# Patient Record
Sex: FEMALE | Race: BLACK OR AFRICAN AMERICAN | NOT HISPANIC OR LATINO | Employment: FULL TIME | URBAN - METROPOLITAN AREA
[De-identification: names, ages, dates, MRNs, and addresses within clinical notes are randomized per-mention and may not be internally consistent; named-entity substitution may affect disease eponyms.]

---

## 2023-02-02 PROBLEM — J03.90 TONSILLITIS WITH EXUDATE: Status: ACTIVE | Noted: 2023-02-02

## 2023-02-02 PROBLEM — R01.1 MURMUR, CARDIAC: Status: ACTIVE | Noted: 2023-02-02

## 2023-02-02 PROBLEM — E11.9 TYPE 2 DIABETES MELLITUS (MULTI): Status: ACTIVE | Noted: 2023-02-02

## 2023-02-02 PROBLEM — R03.0 BORDERLINE HYPERTENSION: Status: ACTIVE | Noted: 2023-02-02

## 2023-02-02 PROBLEM — H10.45 CHRONIC ALLERGIC CONJUNCTIVITIS: Status: ACTIVE | Noted: 2023-02-02

## 2023-02-02 PROBLEM — J02.9 SORE THROAT: Status: ACTIVE | Noted: 2023-02-02

## 2023-02-02 PROBLEM — M17.10 PRIMARY LOCALIZED OSTEOARTHRITIS OF KNEE: Status: ACTIVE | Noted: 2023-02-02

## 2023-02-02 PROBLEM — E66.9 OBESITY: Status: ACTIVE | Noted: 2023-02-02

## 2023-02-02 PROBLEM — D57.3 SICKLE CELL TRAIT (CMS-HCC): Status: ACTIVE | Noted: 2023-02-02

## 2023-02-02 PROBLEM — M23.90 INTERNAL DERANGEMENT OF KNEE: Status: ACTIVE | Noted: 2023-02-02

## 2023-02-02 PROBLEM — K64.9 HEMORRHOIDS: Status: ACTIVE | Noted: 2023-02-02

## 2023-02-02 PROBLEM — G43.909 HEADACHE, MIGRAINE: Status: ACTIVE | Noted: 2023-02-02

## 2023-02-02 PROBLEM — H53.8 BLURRY VISION: Status: ACTIVE | Noted: 2023-02-02

## 2023-02-02 PROBLEM — Z97.5 BREAKTHROUGH BLEEDING ON NEXPLANON: Status: ACTIVE | Noted: 2023-02-02

## 2023-02-02 PROBLEM — R73.9 ELEVATED BLOOD SUGAR: Status: ACTIVE | Noted: 2023-02-02

## 2023-02-02 PROBLEM — N92.1 BREAKTHROUGH BLEEDING ON NEXPLANON: Status: ACTIVE | Noted: 2023-02-02

## 2023-02-02 PROBLEM — E55.9 VITAMIN D DEFICIENCY: Status: ACTIVE | Noted: 2023-02-02

## 2023-02-02 RX ORDER — INSULIN GLARGINE 100 [IU]/ML
40 INJECTION, SOLUTION SUBCUTANEOUS NIGHTLY
COMMUNITY
Start: 2019-07-29 | End: 2023-03-16 | Stop reason: SDUPTHER

## 2023-02-02 RX ORDER — BLOOD SUGAR DIAGNOSTIC
STRIP MISCELLANEOUS 4 TIMES DAILY
COMMUNITY
Start: 2020-01-21 | End: 2023-06-21 | Stop reason: SDUPTHER

## 2023-02-02 RX ORDER — IBUPROFEN 800 MG/1
1 TABLET ORAL 3 TIMES DAILY PRN
COMMUNITY
Start: 2021-07-07 | End: 2023-04-27 | Stop reason: SDUPTHER

## 2023-02-02 RX ORDER — ETONOGESTREL 68 MG/1
IMPLANT SUBCUTANEOUS
COMMUNITY
Start: 2018-02-19 | End: 2024-05-21 | Stop reason: ALTCHOICE

## 2023-02-02 RX ORDER — METFORMIN HYDROCHLORIDE 500 MG/1
1 TABLET ORAL 2 TIMES DAILY
COMMUNITY
Start: 2022-04-05 | End: 2023-03-16 | Stop reason: SDUPTHER

## 2023-02-02 RX ORDER — INSULIN LISPRO 100 [IU]/ML
INJECTION, SUSPENSION SUBCUTANEOUS
COMMUNITY
End: 2023-03-16 | Stop reason: ALTCHOICE

## 2023-03-14 ASSESSMENT — ENCOUNTER SYMPTOMS
POLYDIPSIA: 0
BLACKOUTS: 0
CONFUSION: 0
SPEECH DIFFICULTY: 0
NERVOUS/ANXIOUS: 0
HUNGER: 0
HEADACHES: 1
SWEATS: 1
WEAKNESS: 0
VISUAL CHANGE: 0
FATIGUE: 0
DIZZINESS: 1
BLURRED VISION: 0
SEIZURES: 0
POLYPHAGIA: 0
TREMORS: 0
WEIGHT LOSS: 0

## 2023-03-16 ENCOUNTER — OFFICE VISIT (OUTPATIENT)
Dept: PRIMARY CARE | Facility: CLINIC | Age: 38
End: 2023-03-16
Payer: COMMERCIAL

## 2023-03-16 VITALS
RESPIRATION RATE: 16 BRPM | WEIGHT: 293 LBS | BODY MASS INDEX: 50.02 KG/M2 | OXYGEN SATURATION: 98 % | DIASTOLIC BLOOD PRESSURE: 96 MMHG | SYSTOLIC BLOOD PRESSURE: 152 MMHG | HEIGHT: 64 IN

## 2023-03-16 DIAGNOSIS — E11.9 TYPE 2 DIABETES MELLITUS WITHOUT COMPLICATION, WITHOUT LONG-TERM CURRENT USE OF INSULIN (MULTI): Primary | ICD-10-CM

## 2023-03-16 DIAGNOSIS — R45.86 MOOD SWINGS: ICD-10-CM

## 2023-03-16 DIAGNOSIS — F41.9 ANXIETY AND DEPRESSION: ICD-10-CM

## 2023-03-16 DIAGNOSIS — F32.A ANXIETY AND DEPRESSION: ICD-10-CM

## 2023-03-16 DIAGNOSIS — G43.109 MIGRAINE WITH AURA AND WITHOUT STATUS MIGRAINOSUS, NOT INTRACTABLE: ICD-10-CM

## 2023-03-16 PROBLEM — J03.90 TONSILLITIS WITH EXUDATE: Status: RESOLVED | Noted: 2023-02-02 | Resolved: 2023-03-16

## 2023-03-16 PROBLEM — K64.9 HEMORRHOIDS: Status: RESOLVED | Noted: 2023-02-02 | Resolved: 2023-03-16

## 2023-03-16 PROBLEM — J02.9 SORE THROAT: Status: RESOLVED | Noted: 2023-02-02 | Resolved: 2023-03-16

## 2023-03-16 LAB
ALBUMIN (MG/L) IN URINE: <7 MG/L
ALBUMIN/CREATININE (UG/MG) IN URINE: NORMAL UG/MG CRT (ref 0–30)
CALCIDIOL (25 OH VITAMIN D3) (NG/ML) IN SER/PLAS: 23 NG/ML
CREATININE (MG/DL) IN URINE: 169 MG/DL (ref 20–320)
POC HEMOGLOBIN A1C: 6.2 % (ref 4.2–6.5)
THYROTROPIN (MIU/L) IN SER/PLAS BY DETECTION LIMIT <= 0.05 MIU/L: 2.03 MIU/L (ref 0.44–3.98)

## 2023-03-16 PROCEDURE — 82306 VITAMIN D 25 HYDROXY: CPT

## 2023-03-16 PROCEDURE — 3080F DIAST BP >= 90 MM HG: CPT | Performed by: NURSE PRACTITIONER

## 2023-03-16 PROCEDURE — 83036 HEMOGLOBIN GLYCOSYLATED A1C: CPT | Performed by: NURSE PRACTITIONER

## 2023-03-16 PROCEDURE — 99214 OFFICE O/P EST MOD 30 MIN: CPT | Performed by: NURSE PRACTITIONER

## 2023-03-16 PROCEDURE — 36415 COLL VENOUS BLD VENIPUNCTURE: CPT | Performed by: NURSE PRACTITIONER

## 2023-03-16 PROCEDURE — 1036F TOBACCO NON-USER: CPT | Performed by: NURSE PRACTITIONER

## 2023-03-16 PROCEDURE — 84443 ASSAY THYROID STIM HORMONE: CPT

## 2023-03-16 PROCEDURE — 82043 UR ALBUMIN QUANTITATIVE: CPT

## 2023-03-16 PROCEDURE — 3008F BODY MASS INDEX DOCD: CPT | Performed by: NURSE PRACTITIONER

## 2023-03-16 PROCEDURE — 82570 ASSAY OF URINE CREATININE: CPT

## 2023-03-16 PROCEDURE — 3077F SYST BP >= 140 MM HG: CPT | Performed by: NURSE PRACTITIONER

## 2023-03-16 RX ORDER — INSULIN GLARGINE 100 [IU]/ML
35 INJECTION, SOLUTION SUBCUTANEOUS NIGHTLY
Qty: 4 EACH | Refills: 11 | Status: SHIPPED | OUTPATIENT
Start: 2023-03-16 | End: 2023-06-28 | Stop reason: SDUPTHER

## 2023-03-16 RX ORDER — ATORVASTATIN CALCIUM 20 MG/1
20 TABLET, FILM COATED ORAL DAILY
Qty: 90 TABLET | Refills: 1 | Status: SHIPPED | OUTPATIENT
Start: 2023-03-16 | End: 2023-06-21 | Stop reason: SDUPTHER

## 2023-03-16 RX ORDER — METFORMIN HYDROCHLORIDE 500 MG/1
500 TABLET ORAL 2 TIMES DAILY
Qty: 180 TABLET | Refills: 3 | Status: SHIPPED | OUTPATIENT
Start: 2023-03-16 | End: 2023-06-28 | Stop reason: SDUPTHER

## 2023-03-16 NOTE — PROGRESS NOTES
Subjective   Patient ID: Mike Chase is a 38 y.o. female who presents for follow up on diabetes. Has complaints of migraines and depression/anxiety.     HPI    Getting migraines at least twice per month. They typically last a couple days. She doesn't know what her trigger is, possibly stress. Typically, she has a sharp, stabbing pain on the right side of her head. Gets an aura, she sees floaters. Gets light sensitivity and occasionally nausea. She has taken sumatriptan without relief. Ibuprofen usually helps but did not seem to help the last couple times it did not have it. Has been on topiramate for migraines in the past but stopped taking because they ran out.     Thinks she's depressed. States she's overwhelmed with working full time, being a single parent. She worries a lot about every one else and has no time for herself. She has weeks where she feels good and happy, and weeks where she feels down/sad. States her coping mechanisms are to overindulge in food or overspend. She denies SI/HI. She would like to talk to a therapist.     T2DM  Last HgA1c: 6.4% on 9/15/22  Current meds: metformin 500 mg BID, basaglar 40 units QHS  Home glucose monitoring: daily  - High: 160s  - Low: 58  - Average: 100s  Diet: pretty good  Exercise: none  Statin: none  ACEI: none  ASA: none  Last urine albumin: 3/29/22  Diabetic foot exam: 6/15/22  Vision exam: within the last year  Dental cleaning: years  Pneumovax: 2013    Denies polyuria, polydipsia, vision changes, yeast infections, neuropathy, or hypoglycemic episodes.        ROS  ROS negative except as noted above in HPI.       Objective   There were no vitals taken for this visit.    Physical Exam  General: Alert and oriented, in no acute distress. Appears stated age, well-nourished, and well hydrated  HEENT:  - Head: Normocephalic and atraumatic   - Eyes: EOMI, PERRLA  - ENT: Hearing grossly intact  Heart: RRR. No murmurs, clicks, or rubs  Lungs: Unlabored breathing. CTAB with  no crackles, wheezes, or rhonchi  Abdomen: Normal BS in all 4 quadrants. Soft, non-tender, non-distended, with no masses  Extremities: Warm and well perfused. No edema. Normal peripheral pulses  Musculoskeletal: Normal gait and station  Neurological: Alert and oriented. No gross neurological deficits  Psychological: Appropriate mood and affect. Tearful at times  Skin: No rash, abnormal lesions, cyanosis, or erythema      Assessment/Plan     Migraines  - Sumatriptan not working, elevated BP in office so rizatriptan contraindicated  - Start Ubrelvy 100 mg Q2H PRN (2 tabs/24 hours) - samples also provided  - Keep headache diary  - Avoid known triggers if possible    Anxiety/depression/mood swings  - Referral placed for access clinic  - Check TSH, vit D    T2DM  - IO HgA1c: 6.2%  - Continue metformin 500 mg BID, basaglar 40 units at bedtime  - Continue to monitor glucose at home  - Urine albumin collected today  - Monofilament up to date  - Encouraged dental cleanings twice per year and retinal eye exams once per year  - Lifestyle management    Obesity  - Referral placed for bariatric surgery per patient request    RTC in 1 month for BP check    Reviewed and approved by MAISHA HARKINS on 3/16/23 at 4:12 PM.

## 2023-03-17 ENCOUNTER — TELEPHONE (OUTPATIENT)
Dept: PRIMARY CARE | Facility: CLINIC | Age: 38
End: 2023-03-17
Payer: COMMERCIAL

## 2023-03-17 DIAGNOSIS — E55.9 VITAMIN D DEFICIENCY: Primary | ICD-10-CM

## 2023-03-17 RX ORDER — ACETAMINOPHEN 500 MG
5000 TABLET ORAL DAILY
Qty: 90 TABLET | Refills: 0 | Status: SHIPPED | OUTPATIENT
Start: 2023-03-17 | End: 2023-06-21 | Stop reason: ALTCHOICE

## 2023-03-17 NOTE — TELEPHONE ENCOUNTER
Notified patient of results      ----- Message from RADHA Banks sent at 3/17/2023 11:54 AM EDT -----  Please let patient know her vitamin D was low at 23 (normal ). I sent a prescription for vitamin D 5000 units to take daily for 3 months. After that she can take 2000 units daily.

## 2023-04-13 ENCOUNTER — OFFICE VISIT (OUTPATIENT)
Dept: PRIMARY CARE | Facility: CLINIC | Age: 38
End: 2023-04-13
Payer: COMMERCIAL

## 2023-04-13 VITALS
BODY MASS INDEX: 65.02 KG/M2 | SYSTOLIC BLOOD PRESSURE: 142 MMHG | TEMPERATURE: 97.6 F | HEART RATE: 77 BPM | DIASTOLIC BLOOD PRESSURE: 72 MMHG | OXYGEN SATURATION: 100 % | WEIGHT: 293 LBS

## 2023-04-13 DIAGNOSIS — G43.109 MIGRAINE WITH AURA AND WITHOUT STATUS MIGRAINOSUS, NOT INTRACTABLE: ICD-10-CM

## 2023-04-13 DIAGNOSIS — I10 BENIGN ESSENTIAL HYPERTENSION: Primary | ICD-10-CM

## 2023-04-13 DIAGNOSIS — Z79.4 TYPE 2 DIABETES MELLITUS WITHOUT COMPLICATION, WITH LONG-TERM CURRENT USE OF INSULIN (MULTI): ICD-10-CM

## 2023-04-13 DIAGNOSIS — E11.9 TYPE 2 DIABETES MELLITUS WITHOUT COMPLICATION, WITH LONG-TERM CURRENT USE OF INSULIN (MULTI): ICD-10-CM

## 2023-04-13 PROCEDURE — 4010F ACE/ARB THERAPY RXD/TAKEN: CPT | Performed by: NURSE PRACTITIONER

## 2023-04-13 PROCEDURE — 3077F SYST BP >= 140 MM HG: CPT | Performed by: NURSE PRACTITIONER

## 2023-04-13 PROCEDURE — 3078F DIAST BP <80 MM HG: CPT | Performed by: NURSE PRACTITIONER

## 2023-04-13 PROCEDURE — 1036F TOBACCO NON-USER: CPT | Performed by: NURSE PRACTITIONER

## 2023-04-13 PROCEDURE — 3008F BODY MASS INDEX DOCD: CPT | Performed by: NURSE PRACTITIONER

## 2023-04-13 PROCEDURE — 99214 OFFICE O/P EST MOD 30 MIN: CPT | Performed by: NURSE PRACTITIONER

## 2023-04-13 RX ORDER — LISINOPRIL 10 MG/1
10 TABLET ORAL DAILY
Qty: 30 TABLET | Refills: 0 | Status: SHIPPED | OUTPATIENT
Start: 2023-04-13 | End: 2023-04-27 | Stop reason: SDUPTHER

## 2023-04-13 NOTE — PROGRESS NOTES
Subjective   Patient ID: Mike Chase is a 38 y.o. female who presents for Migraine (FUV/Pt stated the medication helps; she still gets them.) and Hypertension (FUV).    HPI     She has had a couple migraines in the last month, lasting a couple days at a time. She did take Nurtec with each migraine but not until the second day. States the migraines went away within an hour or so and she did not have any side effects. Believes her migraine trigger is looking at her computer screens/switching between screens.     Of note, blood pressure still elevated in the office. She does not check her blood pressure at home. States her diet is pretty good. Does not exercise. Limits her sodium and caffeine intake.     Review of Systems    ROS negative except as noted above in HPI.     Objective   /72   Pulse 77   Temp 36.4 °C (97.6 °F)   Wt (!) 172 kg (378 lb 12.8 oz)   SpO2 100%   BMI 65.02 kg/m²     Physical Exam    General: Alert and oriented, in no acute distress. Appears stated age, well-nourished, and well hydrated  HEENT:  - Head: Normocephalic and atraumatic   - Eyes: EOMI, PERRLA  - ENT: Hearing grossly intact  Heart: RRR. No murmurs, clicks, or rubs  Lungs: Unlabored breathing. CTAB with no crackles, wheezes, or rhonchi  Abdomen: Normal BS in all 4 quadrants. Soft, non-tender, non-distended, with no masses  Extremities: Warm and well perfused. No edema. Normal peripheral pulses  Musculoskeletal: Normal gait and station  Neurological: Alert and oriented. No gross neurological deficits  Psychological: Appropriate mood and affect  Skin: No rash, abnormal lesions, cyanosis, or erythema      Assessment/Plan     Migraines  - Has had 2 migraines in the last month, lasting 2 days - resolved when she took Nurtec  -- Advised to take Nurtec at first sign of migraine  - Believes trigger is computer screen/switching between screens  - Keep headache diary  - Avoid known triggers when possible    Hypertension, goal <130/80  -  Uncontrolled  - Start lisinopril 10 mg every day (also provides renoprotection with T2DM)  - Check BP at home   - Lifestyle management    RTC in 1 month for hypertension or sooner prn    Reviewed and approved by MAISHA HARKINS on 4/13/23 at 10:16 AM.

## 2023-04-27 DIAGNOSIS — Z79.4 TYPE 2 DIABETES MELLITUS WITHOUT COMPLICATION, WITH LONG-TERM CURRENT USE OF INSULIN (MULTI): ICD-10-CM

## 2023-04-27 DIAGNOSIS — I10 BENIGN ESSENTIAL HYPERTENSION: ICD-10-CM

## 2023-04-27 DIAGNOSIS — E11.9 TYPE 2 DIABETES MELLITUS WITHOUT COMPLICATION, WITH LONG-TERM CURRENT USE OF INSULIN (MULTI): ICD-10-CM

## 2023-04-27 DIAGNOSIS — M23.90 INTERNAL DERANGEMENT OF KNEE, UNSPECIFIED LATERALITY: Primary | ICD-10-CM

## 2023-04-27 RX ORDER — IBUPROFEN 800 MG/1
800 TABLET ORAL 3 TIMES DAILY PRN
Qty: 90 TABLET | Refills: 0 | Status: SHIPPED | OUTPATIENT
Start: 2023-04-27 | End: 2023-05-27

## 2023-04-27 RX ORDER — LISINOPRIL 10 MG/1
10 TABLET ORAL DAILY
Qty: 30 TABLET | Refills: 0 | Status: SHIPPED | OUTPATIENT
Start: 2023-04-27 | End: 2023-06-15 | Stop reason: SDUPTHER

## 2023-05-30 ENCOUNTER — APPOINTMENT (OUTPATIENT)
Dept: PRIMARY CARE | Facility: CLINIC | Age: 38
End: 2023-05-30
Payer: COMMERCIAL

## 2023-06-15 DIAGNOSIS — I10 BENIGN ESSENTIAL HYPERTENSION: ICD-10-CM

## 2023-06-15 DIAGNOSIS — Z79.4 TYPE 2 DIABETES MELLITUS WITHOUT COMPLICATION, WITH LONG-TERM CURRENT USE OF INSULIN (MULTI): ICD-10-CM

## 2023-06-15 DIAGNOSIS — E11.9 TYPE 2 DIABETES MELLITUS WITHOUT COMPLICATION, WITH LONG-TERM CURRENT USE OF INSULIN (MULTI): ICD-10-CM

## 2023-06-15 RX ORDER — LISINOPRIL 10 MG/1
10 TABLET ORAL DAILY
Qty: 30 TABLET | Refills: 0 | Status: SHIPPED | OUTPATIENT
Start: 2023-06-15 | End: 2023-06-28 | Stop reason: SDUPTHER

## 2023-06-21 ENCOUNTER — OFFICE VISIT (OUTPATIENT)
Dept: PRIMARY CARE | Facility: CLINIC | Age: 38
End: 2023-06-21
Payer: COMMERCIAL

## 2023-06-21 VITALS
SYSTOLIC BLOOD PRESSURE: 116 MMHG | WEIGHT: 293 LBS | HEIGHT: 64 IN | BODY MASS INDEX: 50.02 KG/M2 | HEART RATE: 75 BPM | DIASTOLIC BLOOD PRESSURE: 87 MMHG

## 2023-06-21 DIAGNOSIS — E11.9 TYPE 2 DIABETES MELLITUS WITHOUT COMPLICATION, WITHOUT LONG-TERM CURRENT USE OF INSULIN (MULTI): ICD-10-CM

## 2023-06-21 DIAGNOSIS — I10 PRIMARY HYPERTENSION: Primary | ICD-10-CM

## 2023-06-21 PROBLEM — M54.2 CERVICALGIA: Status: ACTIVE | Noted: 2023-06-21

## 2023-06-21 PROBLEM — M54.50 LUMBAGO WITHOUT SCIATICA: Status: ACTIVE | Noted: 2023-06-21

## 2023-06-21 PROCEDURE — 3074F SYST BP LT 130 MM HG: CPT | Performed by: NURSE PRACTITIONER

## 2023-06-21 PROCEDURE — 3008F BODY MASS INDEX DOCD: CPT | Performed by: NURSE PRACTITIONER

## 2023-06-21 PROCEDURE — 99213 OFFICE O/P EST LOW 20 MIN: CPT | Performed by: NURSE PRACTITIONER

## 2023-06-21 PROCEDURE — 1036F TOBACCO NON-USER: CPT | Performed by: NURSE PRACTITIONER

## 2023-06-21 PROCEDURE — 3079F DIAST BP 80-89 MM HG: CPT | Performed by: NURSE PRACTITIONER

## 2023-06-21 PROCEDURE — 4010F ACE/ARB THERAPY RXD/TAKEN: CPT | Performed by: NURSE PRACTITIONER

## 2023-06-21 RX ORDER — ATORVASTATIN CALCIUM 20 MG/1
20 TABLET, FILM COATED ORAL DAILY
Qty: 90 TABLET | Refills: 1 | Status: SHIPPED | OUTPATIENT
Start: 2023-06-21 | End: 2023-06-28 | Stop reason: SDUPTHER

## 2023-06-21 RX ORDER — BLOOD SUGAR DIAGNOSTIC
STRIP MISCELLANEOUS
Qty: 200 STRIP | Refills: 3 | Status: SHIPPED | OUTPATIENT
Start: 2023-06-21

## 2023-06-21 RX ORDER — IBUPROFEN 800 MG/1
800 TABLET ORAL EVERY 8 HOURS PRN
COMMUNITY
Start: 2015-07-13 | End: 2023-06-28 | Stop reason: SDUPTHER

## 2023-06-21 NOTE — PROGRESS NOTES
"Subjective   Patient ID: Mike Chase is a 38 y.o. female who presents for Hypertension.    HPI   Current meds: lisinopril 10 mg every day (did not take this morning)  Home blood pressure monitoring: none, does not have a monitor  Salt intake: limits   Caffeine intake: occasional tea  Diet: fine   Exercise: active   Alcohol use: occasional  Tobacco use: none  Illicit drug use: none    Denies vision changes, headaches, dizziness, chest pain, palpitations, or edema.      Review of Systems  ROS negative except as noted above in HPI.     Objective   /87   Pulse 75   Ht 1.626 m (5' 4\")   Wt (!) 168 kg (371 lb 3.2 oz)   BMI 63.72 kg/m²     Physical Exam  General: Alert and oriented, in no acute distress. Appears stated age, well-nourished, and well hydrated  HEENT:  - Head: Normocephalic and atraumatic   - Eyes: EOMI, PERRLA  - ENT: Hearing grossly intact  Heart: RRR. No murmurs, clicks, or rubs  Lungs: Unlabored breathing. CTAB with no crackles, wheezes, or rhonchi  Abdomen: Normal BS in all 4 quadrants. Soft, non-tender, non-distended, with no masses  Extremities: Warm and well perfused. No edema. Normal peripheral pulses  Musculoskeletal: Normal gait and station  Neurological: Alert and oriented. No gross neurological deficits  Psychological: Appropriate mood and affect  Skin: No rash, abnormal lesions, cyanosis, or erythema      Assessment/Plan   Hypertension, goal <130/80  - Diastolic above goal, but did not take lisinopril this morning  - Continue lisinopril 10 mg every day  - Obtain BP monitor to check at home  - Lifestyle management    RTC in 1 month for chronic care or sooner prn; advised to take lisinopril at least an hour prior    THEODORE Kinney-CNP  [facility]          "

## 2023-06-28 DIAGNOSIS — G43.109 MIGRAINE WITH AURA AND WITHOUT STATUS MIGRAINOSUS, NOT INTRACTABLE: Primary | ICD-10-CM

## 2023-06-28 DIAGNOSIS — I10 BENIGN ESSENTIAL HYPERTENSION: ICD-10-CM

## 2023-06-28 DIAGNOSIS — E11.9 TYPE 2 DIABETES MELLITUS WITHOUT COMPLICATION, WITHOUT LONG-TERM CURRENT USE OF INSULIN (MULTI): ICD-10-CM

## 2023-06-28 DIAGNOSIS — Z79.4 TYPE 2 DIABETES MELLITUS WITHOUT COMPLICATION, WITH LONG-TERM CURRENT USE OF INSULIN (MULTI): ICD-10-CM

## 2023-06-28 DIAGNOSIS — E11.9 TYPE 2 DIABETES MELLITUS WITHOUT COMPLICATION, WITH LONG-TERM CURRENT USE OF INSULIN (MULTI): ICD-10-CM

## 2023-06-28 RX ORDER — IBUPROFEN 800 MG/1
800 TABLET ORAL EVERY 8 HOURS PRN
Qty: 90 TABLET | Refills: 0 | Status: SHIPPED | OUTPATIENT
Start: 2023-06-28 | End: 2023-08-09 | Stop reason: SDUPTHER

## 2023-06-28 RX ORDER — METFORMIN HYDROCHLORIDE 500 MG/1
500 TABLET ORAL 2 TIMES DAILY
Qty: 180 TABLET | Refills: 3 | Status: SHIPPED | OUTPATIENT
Start: 2023-06-28 | End: 2023-10-24 | Stop reason: SDUPTHER

## 2023-06-28 RX ORDER — LISINOPRIL 10 MG/1
10 TABLET ORAL DAILY
Qty: 90 TABLET | Refills: 1 | Status: SHIPPED | OUTPATIENT
Start: 2023-06-28 | End: 2023-07-19 | Stop reason: SDUPTHER

## 2023-06-28 RX ORDER — INSULIN GLARGINE 100 [IU]/ML
35 INJECTION, SOLUTION SUBCUTANEOUS NIGHTLY
Qty: 33 ML | Refills: 1 | Status: SHIPPED | OUTPATIENT
Start: 2023-06-28 | End: 2023-11-22 | Stop reason: SDUPTHER

## 2023-06-28 RX ORDER — ATORVASTATIN CALCIUM 20 MG/1
20 TABLET, FILM COATED ORAL DAILY
Qty: 90 TABLET | Refills: 1 | Status: SHIPPED | OUTPATIENT
Start: 2023-06-28 | End: 2023-12-15 | Stop reason: SDUPTHER

## 2023-07-19 ENCOUNTER — OFFICE VISIT (OUTPATIENT)
Dept: PRIMARY CARE | Facility: CLINIC | Age: 38
End: 2023-07-19
Payer: COMMERCIAL

## 2023-07-19 VITALS
OXYGEN SATURATION: 99 % | DIASTOLIC BLOOD PRESSURE: 96 MMHG | BODY MASS INDEX: 63.72 KG/M2 | HEART RATE: 77 BPM | SYSTOLIC BLOOD PRESSURE: 144 MMHG | HEIGHT: 64 IN

## 2023-07-19 DIAGNOSIS — R29.818 SUSPECTED SLEEP APNEA: ICD-10-CM

## 2023-07-19 DIAGNOSIS — I10 BENIGN ESSENTIAL HYPERTENSION: ICD-10-CM

## 2023-07-19 DIAGNOSIS — F41.9 ANXIETY AND DEPRESSION: ICD-10-CM

## 2023-07-19 DIAGNOSIS — R51.9 CHRONIC DAILY HEADACHE: ICD-10-CM

## 2023-07-19 DIAGNOSIS — G43.109 MIGRAINE WITH AURA AND WITHOUT STATUS MIGRAINOSUS, NOT INTRACTABLE: ICD-10-CM

## 2023-07-19 DIAGNOSIS — Z79.4 TYPE 2 DIABETES MELLITUS WITHOUT COMPLICATION, WITH LONG-TERM CURRENT USE OF INSULIN (MULTI): Primary | ICD-10-CM

## 2023-07-19 DIAGNOSIS — F32.A ANXIETY AND DEPRESSION: ICD-10-CM

## 2023-07-19 DIAGNOSIS — E11.9 TYPE 2 DIABETES MELLITUS WITHOUT COMPLICATION, WITH LONG-TERM CURRENT USE OF INSULIN (MULTI): Primary | ICD-10-CM

## 2023-07-19 PROBLEM — R73.9 ELEVATED BLOOD SUGAR: Status: RESOLVED | Noted: 2023-02-02 | Resolved: 2023-07-19

## 2023-07-19 PROBLEM — R03.0 BORDERLINE HYPERTENSION: Status: RESOLVED | Noted: 2023-02-02 | Resolved: 2023-07-19

## 2023-07-19 LAB — POC HEMOGLOBIN A1C: 6.5 % (ref 4.2–6.5)

## 2023-07-19 PROCEDURE — 1036F TOBACCO NON-USER: CPT | Performed by: NURSE PRACTITIONER

## 2023-07-19 PROCEDURE — 83036 HEMOGLOBIN GLYCOSYLATED A1C: CPT | Performed by: NURSE PRACTITIONER

## 2023-07-19 PROCEDURE — 3080F DIAST BP >= 90 MM HG: CPT | Performed by: NURSE PRACTITIONER

## 2023-07-19 PROCEDURE — 4010F ACE/ARB THERAPY RXD/TAKEN: CPT | Performed by: NURSE PRACTITIONER

## 2023-07-19 PROCEDURE — 3008F BODY MASS INDEX DOCD: CPT | Performed by: NURSE PRACTITIONER

## 2023-07-19 PROCEDURE — 99214 OFFICE O/P EST MOD 30 MIN: CPT | Performed by: NURSE PRACTITIONER

## 2023-07-19 PROCEDURE — 3077F SYST BP >= 140 MM HG: CPT | Performed by: NURSE PRACTITIONER

## 2023-07-19 RX ORDER — LISINOPRIL 20 MG/1
20 TABLET ORAL DAILY
Qty: 90 TABLET | Refills: 0 | Status: SHIPPED | OUTPATIENT
Start: 2023-07-19 | End: 2023-08-09 | Stop reason: SDUPTHER

## 2023-07-19 RX ORDER — TOPIRAMATE 25 MG/1
TABLET ORAL
Qty: 70 TABLET | Refills: 0 | Status: SHIPPED | OUTPATIENT
Start: 2023-07-19 | End: 2023-08-09 | Stop reason: SDUPTHER

## 2023-07-19 ASSESSMENT — ENCOUNTER SYMPTOMS
HEADACHES: 0
VISUAL CHANGE: 0
BLACKOUTS: 0
SPEECH DIFFICULTY: 0
CONFUSION: 0
BLURRED VISION: 0
TREMORS: 0
WEAKNESS: 0
POLYPHAGIA: 0
FATIGUE: 0
WEIGHT LOSS: 0
SWEATS: 0
HUNGER: 0
POLYDIPSIA: 0
NERVOUS/ANXIOUS: 0
DIZZINESS: 0
SEIZURES: 0

## 2023-07-19 NOTE — PROGRESS NOTES
Subjective   Patient ID: Mike Chase is a 38 y.o. female who presents for Follow-up.    HPI     Hypertension  Current meds: lisinopril 10 mg every day   Home blood pressure monitoring: once daily in the morning, running 110s/80  Salt intake: limits   Caffeine intake: occasional tea  Diet: fine   Exercise: active   Alcohol use: occasional  Tobacco use: none  Illicit drug use: none     Denies vision changes, headaches, dizziness, chest pain, palpitations, or edema.    T2DM  Last HgA1c: 6.2% on 3/16/23  Current meds: metformin 500 mg BID, basaglar 35 units QHS  Home glucose monitoring: daily  - High: 110  - Low: 67  - Average: 85-90  Diet: as above  Exercise: as above  Statin: none  ACEI: none  ASA: none  Last urine albumin: 3/16/23  Diabetic foot exam: 6/15/22, declined   Vision exam: 2022  Dental cleaning: years  Pneumovax: 2013     Had one episode of hypoglycemia this past week (67). Ate some food and it came back up. Denies polyuria, polydipsia, vision changes, yeast infections, or neuropathy.     Migraines  Getting migraines at least twice per month. They typically last a couple days. She doesn't know what her trigger is, possibly stress. Typically, she has a sharp, stabbing pain on the right side of her head. Gets an aura, she sees floaters. Gets light sensitivity and occasionally nausea. Has tried Ubrelvy and Nurtec samples and they helps sometimes. Has been getting frontal headaches daily when working in front of the computer screen. Wears blue light glasses. Has been on topiramate for migraines in the past but stopped taking because they ran out. Would like to restart.     Anxiety/depression  Manageable at this time. She is seeing a counselor once per week, eventually will be seeing her every other week. Does have trouble falling asleep because she's restless and her brain doesn't shut off. She does wake up frequently at night but denies cough/choking or witness apneic episodes. States she snores at times.  "Denies SI/HI.     Review of Systems  ROS negative except as noted above in HPI.       Objective   BP (!) 144/96   Pulse 77   Ht 1.626 m (5' 4\")   SpO2 99%   BMI 63.72 kg/m²     Physical Exam  General: Alert and oriented, in no acute distress. Appears stated age, well-nourished, and well hydrated  HEENT:  - Head: Normocephalic and atraumatic   - Eyes: EOMI, PERRLA  - ENT: Hearing grossly intact  Heart: RRR. No murmurs, clicks, or rubs  Lungs: Unlabored breathing. CTAB with no crackles, wheezes, or rhonchi  Abdomen: Normal BS in all 4 quadrants. Soft, non-tender, non-distended, with no masses  Extremities: Warm and well perfused. No edema. Normal peripheral pulses  Musculoskeletal: Normal gait and station  Neurological: Alert and oriented. No gross neurological deficits  Psychological: Appropriate mood and affect  Skin: No rash, abnormal lesions, cyanosis, or erythema      Assessment/Plan   T2DM  - IO HgA1c: 6.5%  - Continue metformin 500 mg BID, basaglar 35 units at bedtime  - Continue to monitor glucose at home  - Urine albumin up to date  - Monofilament due, declined  - Encouraged dental cleanings twice per year and retinal eye exams once per year  - Lifestyle management     Hypertension, goal <130/80  - Uncontrolled  - Increase lisinopril to 20 mg every day  - Lifestyle management    Migraines  - Triptans contraindicated with elevated BP  - RX sent for Ubrelvy 100 mg Q2H PRN (2 tabs/24H)  - Keep headache diary  - Avoid known triggers if possible    Anxiety/depression  - Manageable at this time  - Continue counseling  - Declined medication    Suspected sleep apnea  - Obtain HSAT    RTC in 1 month for hypertension and headaches or sooner prn    THEODORE Kinney-CNP  [facility]          "

## 2023-08-07 ASSESSMENT — ENCOUNTER SYMPTOMS
PALPITATIONS: 0
NECK PAIN: 0
HEADACHES: 0
BLURRED VISION: 0
SHORTNESS OF BREATH: 0
SWEATS: 0
PND: 0
HYPERTENSION: 1
ORTHOPNEA: 0

## 2023-08-09 ENCOUNTER — OFFICE VISIT (OUTPATIENT)
Dept: PRIMARY CARE | Facility: CLINIC | Age: 38
End: 2023-08-09
Payer: COMMERCIAL

## 2023-08-09 VITALS
HEART RATE: 71 BPM | DIASTOLIC BLOOD PRESSURE: 87 MMHG | SYSTOLIC BLOOD PRESSURE: 132 MMHG | RESPIRATION RATE: 16 BRPM | WEIGHT: 293 LBS | HEIGHT: 64 IN | BODY MASS INDEX: 50.02 KG/M2

## 2023-08-09 DIAGNOSIS — I10 BENIGN ESSENTIAL HYPERTENSION: Primary | ICD-10-CM

## 2023-08-09 DIAGNOSIS — E11.9 TYPE 2 DIABETES MELLITUS WITHOUT COMPLICATION, WITH LONG-TERM CURRENT USE OF INSULIN (MULTI): ICD-10-CM

## 2023-08-09 DIAGNOSIS — R51.9 CHRONIC DAILY HEADACHE: ICD-10-CM

## 2023-08-09 DIAGNOSIS — Z79.4 TYPE 2 DIABETES MELLITUS WITHOUT COMPLICATION, WITH LONG-TERM CURRENT USE OF INSULIN (MULTI): ICD-10-CM

## 2023-08-09 DIAGNOSIS — G43.109 MIGRAINE WITH AURA AND WITHOUT STATUS MIGRAINOSUS, NOT INTRACTABLE: ICD-10-CM

## 2023-08-09 PROCEDURE — 1036F TOBACCO NON-USER: CPT | Performed by: NURSE PRACTITIONER

## 2023-08-09 PROCEDURE — 3075F SYST BP GE 130 - 139MM HG: CPT | Performed by: NURSE PRACTITIONER

## 2023-08-09 PROCEDURE — 99213 OFFICE O/P EST LOW 20 MIN: CPT | Performed by: NURSE PRACTITIONER

## 2023-08-09 PROCEDURE — 3008F BODY MASS INDEX DOCD: CPT | Performed by: NURSE PRACTITIONER

## 2023-08-09 PROCEDURE — 4010F ACE/ARB THERAPY RXD/TAKEN: CPT | Performed by: NURSE PRACTITIONER

## 2023-08-09 PROCEDURE — 3079F DIAST BP 80-89 MM HG: CPT | Performed by: NURSE PRACTITIONER

## 2023-08-09 RX ORDER — LISINOPRIL 20 MG/1
20 TABLET ORAL DAILY
Qty: 90 TABLET | Refills: 0 | Status: SHIPPED | OUTPATIENT
Start: 2023-08-09 | End: 2023-08-24

## 2023-08-09 RX ORDER — TOPIRAMATE 25 MG/1
TABLET ORAL
Qty: 70 TABLET | Refills: 0 | Status: SHIPPED | OUTPATIENT
Start: 2023-08-09 | End: 2023-09-08 | Stop reason: SDUPTHER

## 2023-08-09 RX ORDER — IBUPROFEN 800 MG/1
800 TABLET ORAL EVERY 8 HOURS PRN
Qty: 90 TABLET | Refills: 0 | Status: SHIPPED | OUTPATIENT
Start: 2023-08-09 | End: 2024-02-29 | Stop reason: SDUPTHER

## 2023-08-09 NOTE — PROGRESS NOTES
"Subjective   Patient ID: Mike Chase is a 38 y.o. female who presents for Hypertension (fuv) and Headache (fuv).    HPI     Hypertension  Current meds: lisinopril 20 mg every day   Home blood pressure monitoring: daily   High: 130/90  Low: 120/75  Average: 130/80  Salt intake: limits   Caffeine intake: none  Diet: getting better   Exercise: active  Alcohol use: occasional  Tobacco use: none  Illicit drug use: none    Denies vision changes, headaches, dizziness, chest pain, palpitations, or edema.      Answers submitted by the patient for this visit:  High Blood Pressure Questionnaire (Submitted on 8/7/2023)  Chief Complaint: Hypertension  Chronicity: new  Onset: 1 to 4 weeks ago  Progression since onset: resolved  anxiety: No  blurred vision: No  chest pain: No  headaches: No  malaise/fatigue: No  neck pain: No  orthopnea: No  palpitations: No  peripheral edema: No  PND: No  shortness of breath: No  sweats: No  Agents associated with hypertension: no associated agents  CAD risks: diabetes mellitus, obesity  Compliance problems: no compliance problems    Migraines  Admits that she did not  topiramate because she has not had a chance to go to the pharmacy. She has been taking ibuprofen twice daily for her chronic knee pain and headaches when she gets them. She took Ubrelvy once and it worked well to get rid of her migraine.     Review of Systems  ROS negative except as noted above in HPI.       Objective   /87   Pulse 71   Ht 1.626 m (5' 4\")   Wt (!) 169 kg (371 lb 12.8 oz)   BMI 63.82 kg/m²     Physical Exam  General: Alert and oriented, in no acute distress. Appears stated age, well-nourished, and well hydrated  HEENT:  - Head: Normocephalic and atraumatic   - Eyes: EOMI, PERRLA  - ENT: Hearing grossly intact  Heart: RRR. No murmurs, clicks, or rubs  Lungs: Unlabored breathing. CTAB with no crackles, wheezes, or rhonchi  Abdomen: Normal BS in all 4 quadrants. Soft, non-tender, non-distended, with " no masses  Extremities: Warm and well perfused. No edema. Normal peripheral pulses  Musculoskeletal: Normal gait and station  Neurological: Alert and oriented. No gross neurological deficits  Psychological: Appropriate mood and affect  Skin: No rash, abnormal lesions, cyanosis, or erythema    Assessment/Plan   Hypertension, goal <130/80  - Patient has been taking ibuprofen 800 mg BID, which is likely causing her BP to be elevated  - Encouraged to cut back on ibuprofen  - Continue lisinopril 20 mg every day  - Continue to monitor BP at home  - Lifestyle management    Migraines/headaches  - Limit ibuprofen use as above  - Sent topiramate to [facility]  - Take Ubrelvy prn    RTC in 1 month for physical, htn, and headaches or sooner prn; will complete labs at that appt    THEODORE Kinney-CNP  [facility]

## 2023-08-21 ENCOUNTER — TELEPHONE (OUTPATIENT)
Dept: PRIMARY CARE | Facility: CLINIC | Age: 38
End: 2023-08-21
Payer: COMMERCIAL

## 2023-08-21 NOTE — TELEPHONE ENCOUNTER
Patient seen in ER for pelvic abcess prescribed 2 antibiotics, the bactrim is making her sick. Patient requesting you reach out to her on my chart.

## 2023-08-24 DIAGNOSIS — I10 BENIGN ESSENTIAL HYPERTENSION: ICD-10-CM

## 2023-08-24 DIAGNOSIS — T78.3XXA ANGIOEDEMA, INITIAL ENCOUNTER: Primary | ICD-10-CM

## 2023-08-24 RX ORDER — PREDNISONE 10 MG/1
TABLET ORAL
Qty: 15 TABLET | Refills: 0 | Status: SHIPPED | OUTPATIENT
Start: 2023-08-24 | End: 2023-08-29

## 2023-08-24 RX ORDER — LOSARTAN POTASSIUM 25 MG/1
25 TABLET ORAL DAILY
Qty: 30 TABLET | Refills: 0 | Status: SHIPPED | OUTPATIENT
Start: 2023-08-24 | End: 2023-09-08 | Stop reason: SDUPTHER

## 2023-08-24 NOTE — PROGRESS NOTES
Patient sent [email or web address] message stating she developed lip swelling since increasing her dose of lisinopril. She denies tongue swelling, throat swelling, difficulty breathing, or wheezing. Advised to stop lisinopril (did not take this morning). Rx sent for prednisone 60891 taper and advised to take zyrtec 10 mg BID for 5 days (can go up to 20 mg BID if needed). Counseled on strict 911/ED precautions.     Rx sent for losartan 25 mg every day in place of lisinopril.    Patient verbalized understanding.     Kiara Trent, THEODORE-CNP  [facility]

## 2023-09-08 ENCOUNTER — TELEPHONE (OUTPATIENT)
Dept: PRIMARY CARE | Facility: CLINIC | Age: 38
End: 2023-09-08

## 2023-09-08 ENCOUNTER — OFFICE VISIT (OUTPATIENT)
Dept: PRIMARY CARE | Facility: CLINIC | Age: 38
End: 2023-09-08
Payer: COMMERCIAL

## 2023-09-08 VITALS
DIASTOLIC BLOOD PRESSURE: 76 MMHG | WEIGHT: 293 LBS | BODY MASS INDEX: 50.02 KG/M2 | HEART RATE: 75 BPM | SYSTOLIC BLOOD PRESSURE: 118 MMHG | HEIGHT: 64 IN | OXYGEN SATURATION: 100 %

## 2023-09-08 DIAGNOSIS — Z00.00 ANNUAL PHYSICAL EXAM: Primary | ICD-10-CM

## 2023-09-08 DIAGNOSIS — Z79.4 TYPE 2 DIABETES MELLITUS WITHOUT COMPLICATION, WITH LONG-TERM CURRENT USE OF INSULIN (MULTI): ICD-10-CM

## 2023-09-08 DIAGNOSIS — Z23 NEED FOR INFLUENZA VACCINATION: ICD-10-CM

## 2023-09-08 DIAGNOSIS — R51.9 CHRONIC DAILY HEADACHE: ICD-10-CM

## 2023-09-08 DIAGNOSIS — G43.109 MIGRAINE WITH AURA AND WITHOUT STATUS MIGRAINOSUS, NOT INTRACTABLE: ICD-10-CM

## 2023-09-08 DIAGNOSIS — I10 BENIGN ESSENTIAL HYPERTENSION: ICD-10-CM

## 2023-09-08 DIAGNOSIS — E11.9 TYPE 2 DIABETES MELLITUS WITHOUT COMPLICATION, WITH LONG-TERM CURRENT USE OF INSULIN (MULTI): ICD-10-CM

## 2023-09-08 DIAGNOSIS — M17.10 PRIMARY LOCALIZED OSTEOARTHRITIS OF KNEE: ICD-10-CM

## 2023-09-08 PROBLEM — L02.234 CARBUNCLE OF GROIN: Status: ACTIVE | Noted: 2023-09-08

## 2023-09-08 LAB
CHOLESTEROL (MG/DL) IN SER/PLAS: 135 MG/DL (ref 0–199)
CHOLESTEROL IN HDL (MG/DL) IN SER/PLAS: 50.7 MG/DL
CHOLESTEROL/HDL RATIO: 2.7
LDL: 76 MG/DL (ref 0–99)
TRIGLYCERIDE (MG/DL) IN SER/PLAS: 44 MG/DL (ref 0–149)
VLDL: 9 MG/DL (ref 0–40)

## 2023-09-08 PROCEDURE — 90471 IMMUNIZATION ADMIN: CPT | Performed by: NURSE PRACTITIONER

## 2023-09-08 PROCEDURE — 3078F DIAST BP <80 MM HG: CPT | Performed by: NURSE PRACTITIONER

## 2023-09-08 PROCEDURE — 90686 IIV4 VACC NO PRSV 0.5 ML IM: CPT | Performed by: NURSE PRACTITIONER

## 2023-09-08 PROCEDURE — 1036F TOBACCO NON-USER: CPT | Performed by: NURSE PRACTITIONER

## 2023-09-08 PROCEDURE — 99396 PREV VISIT EST AGE 40-64: CPT | Performed by: NURSE PRACTITIONER

## 2023-09-08 PROCEDURE — 4010F ACE/ARB THERAPY RXD/TAKEN: CPT | Performed by: NURSE PRACTITIONER

## 2023-09-08 PROCEDURE — 80061 LIPID PANEL: CPT

## 2023-09-08 PROCEDURE — 3074F SYST BP LT 130 MM HG: CPT | Performed by: NURSE PRACTITIONER

## 2023-09-08 PROCEDURE — 3008F BODY MASS INDEX DOCD: CPT | Performed by: NURSE PRACTITIONER

## 2023-09-08 PROCEDURE — 99212 OFFICE O/P EST SF 10 MIN: CPT | Performed by: NURSE PRACTITIONER

## 2023-09-08 RX ORDER — TOPIRAMATE 50 MG/1
50 TABLET, FILM COATED ORAL 2 TIMES DAILY
Qty: 180 TABLET | Refills: 1 | Status: SHIPPED | OUTPATIENT
Start: 2023-09-08 | End: 2023-09-12 | Stop reason: SDUPTHER

## 2023-09-08 RX ORDER — LOSARTAN POTASSIUM 25 MG/1
25 TABLET ORAL DAILY
Qty: 90 TABLET | Refills: 1 | Status: SHIPPED | OUTPATIENT
Start: 2023-09-08 | End: 2023-12-15 | Stop reason: SDUPTHER

## 2023-09-08 NOTE — PROGRESS NOTES
Mike Chase is a 38 y.o. female who is presenting for annual physical exam and hypertension.    Blood pressure well controlled today in office with losartan 25 mg every day.  They have been running 110s-120s/70s at home.     Requesting handicap placard for her knee pain.     Last  Visit: unknown  Reported Health: Good    Dental, Vision, Hearing:  Regular dental visits: no  - Brushes teeth 1 times per day  - Flosses? no  Vision problems: yes  - Wears glasses or contacts? yes, glasses and contacts  - Last eye exam:    Hearing loss: no    Immunization status:  Up to date: no    Lifestyle:  Healthy diet: yes  Regular exercise: no, active  Alcohol: yes, occasionally  Tobacco: no  Drugs: no    Reproductive Health:  Menstrual problems: yes, cramps  - LMP:  last month   Sexually active: no  Contraception: nexplanon    Pregnancy History:   : 5  Parity: 3  - Full term:   - Premature:  - (s): 2  - Living:  - AB Induced:  - AB Spont:  - Ectopic:   - Multiple births:    Cervical Cancer Screening:  Last pap:  2020  History of abnormal pap smear? yes  Breast Cancer Screening:  Last mammogram:  2023  Colorectal Cancer Screening:  Last colonoscopy or Cologuard:  none  Metabolic Screening:  Lipid profile: 2022  Glucose screen: 2023    Review of Systems  Gen: denies fever, chills, weight loss, fatigue  HEENT: denies sinus pressure, sinus congestion, runny nose, red eyes, itchy eyes, vision loss, ear pain, hearing loss, throat pain, trouble swallowing  Neck: denies neck pain, neck swelling or masses  Chest/breast: denies breast pain, breast lumps, nipple discharge  CV: denies chest pain, palpitations, fast heart rate, syncope  Resp: denies shortness of breath, cough, wheezing  GI: denies abdominal pain, nausea, diarrhea, constipation, hematochezia, melena  : denies dysuria, hematuria, vaginal/penile discharge, frequency  Endo: denies polydipsia, polyuria, heat/cold intolerance, weight  change, hair thinning  Heme: denies easy bruising, easy bleeding  Neuro: denies headache, numbness, tingling, memory loss, changes in vision  MSK: chronic right knee pain. denies joint swelling, weakness  Psych: denies suicidal ideation, homicidal ideation, depression, anxiety, mood swings  Skin: denies rashes, abnormal lesions, itching, changes in moles     Previous History  Past Medical History:   Diagnosis Date    Allergic rhinitis due to pollen 05/26/2016    Allergic rhinitis due to pollen    Contact with and (suspected) exposure to infections with a predominantly sexual mode of transmission 05/12/2014    Exposure to STD    Dysuria 05/13/2016    Dysuria    Encounter for routine postpartum follow-up 06/22/2017    Postpartum care and examination    Encounter for supervision of normal pregnancy, unspecified, first trimester 03/07/2017    Prenatal care in first trimester    Encounter for supervision of normal pregnancy, unspecified, third trimester 05/11/2017    Prenatal care in third trimester    Maternal care for (suspected) fetal abnormality and damage, unspecified, not applicable or unspecified 01/24/2017    Known or suspected fetal anomaly, antepartum    Migraine, unspecified, not intractable, without status migrainosus 07/22/2016    Headache, migraine    Moderate cervical dysplasia 05/08/2014    CHRISTY II (cervical intraepithelial neoplasia II)    Obesity complicating pregnancy, unspecified trimester 05/11/2017    Obesity in pregnancy    Other chronic diseases of tonsils and adenoids 09/13/2016    Tonsil stone    Other specified disorders of amniotic fluid and membranes, first trimester, not applicable or unspecified 11/01/2016    Subchorionic hemorrhage in first trimester    Personal history of other diseases of the digestive system 03/08/2016    History of constipation    Personal history of other diseases of the digestive system 03/08/2016    History of constipation    Personal history of other diseases of the  female genital tract 04/27/2018    History of amenorrhea    Personal history of other endocrine, nutritional and metabolic disease 07/07/2017    History of vitamin D deficiency    Personal history of other specified conditions 03/18/2016    History of nausea    Personal history of other specified conditions 03/18/2016    History of abdominal pain    Personal history of urinary (tract) infections 04/27/2016    History of recurrent urinary tract infection    Personal history of urinary (tract) infections 04/27/2016    History of urinary tract infection    Prediabetes 04/22/2016    Prediabetes    Unspecified diabetes mellitus in pregnancy, unspecified trimester 11/01/2016    Pregnancy diabetes     Past Surgical History:   Procedure Laterality Date    CERVICAL BIOPSY  W/ LOOP ELECTRODE EXCISION  05/08/2014    Cervical Loop Electrosurgical Excision (LEEP)     Social History     Tobacco Use    Smoking status: Never    Smokeless tobacco: Never   Substance Use Topics    Alcohol use: Yes     Comment: occasionally    Drug use: Never     Family History   Problem Relation Name Age of Onset    Breast cancer Mother      Breast cancer Mother's Sister      Diabetes Mother's Sister       Allergies   Allergen Reactions    Lisinopril Angioedema     Lip swelling     Current Outpatient Medications   Medication Instructions    atorvastatin (LIPITOR) 20 mg, oral, Daily    blood sugar diagnostic (Accu-Chek Guide test strips) strip Use to check blood sugar 4 times daily    etonogestrel-eluting contraceptive (Nexplanon) 68 mg implant implant Placed 3/30/2021    glucagon (GLUCAGEN) 1 mg, intramuscular, Once as needed, USE AS DIRECTED.    ibuprofen 800 mg, oral, Every 8 hours PRN    insulin glargine (BASAGLAR KWIKPEN U-100 INSULIN) 35 Units, subcutaneous, Nightly    losartan (COZAAR) 25 mg, oral, Daily    metFORMIN (GLUCOPHAGE) 500 mg, oral, 2 times daily, Take with food.    pen needle, diabetic (BD ULTRA-FINE SHORT PEN NEEDLE MISC) 31G x 8MM;  use once daily    topiramate (Topamax) 25 mg tablet Take 1 tab every day for 7 days, then 1 tab BID for 7 days, then 2 tabs in the morning and 1 tab at night for 7 days, then 2 tabs BID    ubrogepant (UBRELVY) 100 mg, oral, Once as needed       Physical Exam  General: Alert and oriented, in no acute distress. Appears stated age, well-nourished, and well hydrated  HEENT:  - Head: Normocephalic and atraumatic   - Eyes: EOMI, PERRLA  - ENT: Hearing grossly intact. Mucus membranes pink and moist without lesions. Tonsils present without swelling or exudates. Good dentition. TMs gray  Neck: Supple. No stiffness. No thyromegaly or thyroid nodules  Heart: RRR. No murmurs, clicks, or rubs  Lungs: Unlabored breathing. CTAB with no crackles, wheezes, or rhonchi  Abdomen: Normal BS in all 4 quadrants. Soft, non-tender, non-distended, with no masses  Extremities: Warm and well perfused. No edema. Normal peripheral pulses  Musculoskeletal: ROM intact. Strength 5/5 in BUE and BLE. No joint swelling. Normal gait and station  Neurological: Alert and oriented. No gross neurological deficits. Normal sensation. No weakness. DTRs +2/4   Psychological: Appropriate mood and affect  Skin: No rash, abnormal lesions, cyanosis, or erythema      Assessment and Plan     Annual Physical  - Flu vaccine given IO  - Pap up to date, due 2025  - Mammogram up to date, due January 2024  - CRC screening started at age 45  - Check lipid panel  - Maintain healthy lifestyle    Hypertension, goal <130/80  - Well controlled  - Continue losartan 25 mg every day   - Continue to monitor BP at home  - Lifestyle managemenet    Chronic knee pain  - Provided handicap placard rx    RTC next month for DM; patient would like to consider THEODORE Rivera-CNP  [facility]

## 2023-09-12 ENCOUNTER — TELEPHONE (OUTPATIENT)
Dept: PRIMARY CARE | Facility: CLINIC | Age: 38
End: 2023-09-12
Payer: COMMERCIAL

## 2023-09-12 DIAGNOSIS — G43.109 MIGRAINE WITH AURA AND WITHOUT STATUS MIGRAINOSUS, NOT INTRACTABLE: ICD-10-CM

## 2023-09-12 DIAGNOSIS — R51.9 CHRONIC DAILY HEADACHE: ICD-10-CM

## 2023-09-12 RX ORDER — TOPIRAMATE 50 MG/1
50 TABLET, FILM COATED ORAL 2 TIMES DAILY
Qty: 20 TABLET | Refills: 0 | Status: SHIPPED | OUTPATIENT
Start: 2023-09-12 | End: 2023-12-15 | Stop reason: SDUPTHER

## 2023-09-26 LAB
ANION GAP IN SER/PLAS: 11 MMOL/L (ref 10–20)
BASOPHILS (10*3/UL) IN BLOOD BY AUTOMATED COUNT: 0.05 X10E9/L (ref 0–0.1)
BASOPHILS/100 LEUKOCYTES IN BLOOD BY AUTOMATED COUNT: 0.6 % (ref 0–2)
CALCIUM (MG/DL) IN SER/PLAS: 9.2 MG/DL (ref 8.6–10.3)
CARBON DIOXIDE, TOTAL (MMOL/L) IN SER/PLAS: 22 MMOL/L (ref 21–32)
CHLORIDE (MMOL/L) IN SER/PLAS: 106 MMOL/L (ref 98–107)
CREATININE (MG/DL) IN SER/PLAS: 0.73 MG/DL (ref 0.5–1.05)
EOSINOPHILS (10*3/UL) IN BLOOD BY AUTOMATED COUNT: 0.2 X10E9/L (ref 0–0.7)
EOSINOPHILS/100 LEUKOCYTES IN BLOOD BY AUTOMATED COUNT: 2.6 % (ref 0–6)
ERYTHROCYTE DISTRIBUTION WIDTH (RATIO) BY AUTOMATED COUNT: 15.5 % (ref 11.5–14.5)
ERYTHROCYTE MEAN CORPUSCULAR HEMOGLOBIN CONCENTRATION (G/DL) BY AUTOMATED: 30.5 G/DL (ref 32–36)
ERYTHROCYTE MEAN CORPUSCULAR VOLUME (FL) BY AUTOMATED COUNT: 84 FL (ref 80–100)
ERYTHROCYTES (10*6/UL) IN BLOOD BY AUTOMATED COUNT: 4.92 X10E12/L (ref 4–5.2)
GFR FEMALE: >90 ML/MIN/1.73M2
GLUCOSE (MG/DL) IN SER/PLAS: 96 MG/DL (ref 74–99)
HEMATOCRIT (%) IN BLOOD BY AUTOMATED COUNT: 41.3 % (ref 36–46)
HEMOGLOBIN (G/DL) IN BLOOD: 12.6 G/DL (ref 12–16)
IMMATURE GRANULOCYTES/100 LEUKOCYTES IN BLOOD BY AUTOMATED COUNT: 0.3 % (ref 0–0.9)
LEUKOCYTES (10*3/UL) IN BLOOD BY AUTOMATED COUNT: 7.8 X10E9/L (ref 4.4–11.3)
LYMPHOCYTES (10*3/UL) IN BLOOD BY AUTOMATED COUNT: 2.27 X10E9/L (ref 1.2–4.8)
LYMPHOCYTES/100 LEUKOCYTES IN BLOOD BY AUTOMATED COUNT: 29.1 % (ref 13–44)
MONOCYTES (10*3/UL) IN BLOOD BY AUTOMATED COUNT: 0.84 X10E9/L (ref 0.1–1)
MONOCYTES/100 LEUKOCYTES IN BLOOD BY AUTOMATED COUNT: 10.8 % (ref 2–10)
NEUTROPHILS (10*3/UL) IN BLOOD BY AUTOMATED COUNT: 4.42 X10E9/L (ref 1.2–7.7)
NEUTROPHILS/100 LEUKOCYTES IN BLOOD BY AUTOMATED COUNT: 56.6 % (ref 40–80)
PLATELETS (10*3/UL) IN BLOOD AUTOMATED COUNT: 423 X10E9/L (ref 150–450)
POTASSIUM (MMOL/L) IN SER/PLAS: 4.2 MMOL/L (ref 3.5–5.3)
SODIUM (MMOL/L) IN SER/PLAS: 135 MMOL/L (ref 136–145)
UREA NITROGEN (MG/DL) IN SER/PLAS: 9 MG/DL (ref 6–23)

## 2023-09-30 NOTE — H&P (VIEW-ONLY)
History of Present Illness:   Admission Reason: EXCISION OF GROIN CARBUNCLE   HPI:    Date of Consult: 23    Referring Provider:  Dr. Max Aguilar    Surgery, Date, and Length:  10/4/23, EXCISION OF GROIN CARBUNCLE, 60 minutes      Mike Chase presents to [facility] for perioperative risk assessment prior to scheduled surgery. She presents with large abscess in the lower  pannus of her abdominal wall.  She has had prior episodes of infections in the right armpit. She is a diabetic and takes insulin and metformin. She also has morbid obesity and hypertension.   This note was created in part upon personal review of patient?s medical records.    Medical History    HTN  DM II  Groin abscess   Migraines  Obesity BMI >60    Caprini: 4  Stop-ban, HTN, obesity     Surgical History    LEEP  Meniscus repair    Pt denies any past history of anesthetic complications such as PONV, awareness, prolonged sedation, dental damage, aspiration, cardiac arrest, difficult intubation, difficult I.V. access or unexpected hospital admissions.  No history of malignant hyperthermia and or pseudocholinesterase deficiency.  No history of blood transfusions.    The patient is not a Jehovah?s witness and will accept blood and blood products if medically indicated. Type and screen not sent.     Body Measurements  Weights    14:20: Weight in kg (Weight (kg))  162   14:20: Weight in lbs ((lbs))  357.1   14:20: BMI (kg/m2) (BMI (kg/m2))  61.349  Height  162.5cm      Family History:   Cancer: yes   Diabetes: yes   Hypertension: yes     Social History:   Social History:  Smoking Status never smoker   Alcohol Use occasionally   Drug Use denies              Allergies:  ·  lisinopril : Angioedema    Medications Prior to Admission:   Orders Reconciliation is incomplete.    metFORMIN 500 mg oral tablet: 1 tab(s) orally 2 times a day   losartan 25 mg oral tablet: 1 tab(s) orally once a day  Ubrelvy 100 mg oral tablet: 1 tab(s) orally  Daughter picked up: prescription.   Identity was verified: Yes.    once, As Needed  Basaglar KwikPen 100 units/mL subcutaneous solution: 40 unit(s) subcutaneous once a day (at bedtime)  topiramate 50 mg oral tablet: 1 tab(s) orally 2 times a day  atorvastatin 10 mg oral tablet: 1 tab(s) orally once a day  ibuprofen 800 mg oral tablet: 1 tab(s) orally 3 times a day  Keflex 500 mg oral capsule: 1 cap(s) orally every 12 hours.    Review of Systems:   Constitutional: NEGATIVE: Fever, Chills, Anorexia,  Weight Loss, Malaise     Eyes: COMMENTS: corrective lenses     ENMT: NEGATIVE: Nasal Discharge, Nasal Congestion,  Ear Pain, Mouth Pain, Throat Pain     Respiratory: NEGATIVE: Dry Cough, Productive Cough,  Hemoptysis, Wheezing, Shortness of Breath     Cardiac: NEGATIVE: Chest Pain, Dyspnea on Exertion,  Orthopnea, Palpitations, Syncope; COMMENTS: Functional 4 Mets. Patient denies SOB walking up 2 flights of stairs     Gastrointestinal: NEGATIVE: Nausea, Vomiting, Diarrhea,  Constipation, Abdominal Pain     Genitourinary: NEGATIVE: Discharge, Dysuria, Flank  Pain, Frequency, Hematuria     Musculoskeletal: NEGATIVE: Decreased ROM, Pain, Swelling,  Stiffness, Weakness     Neurological: NEGATIVE: Dizziness, Confusion, Headache,  Seizures, Syncope     Psychiatric: NEGATIVE: Mood Changes, Anxiety, Hallucinations,  Sleep Changes, Suicidal Ideas     Skin: NEGATIVE: Mass, Pain, Pruritus, Rash, Ulcer     Endocrine: NEGATIVE: Heat Intolerance, Cold Intolerance,  Sweat, Polyuria, Thirst     Hematologic/Lymph: NEGATIVE: Anemia, Bruising, Easy  Bleeding, Night Sweats, Petechiae     Allergic/Immunologic: NEGATIVE: Anaphylaxis, Itchy/  Teary Eyes, Itching, Sneezing, Swelling     Breast: NEGATIVE: Pain, Mass, Discharge, Nipple Itching,  Gynecomastia       Objective:     Objective Information:        T   P  R  BP   MAP  SpO2   Value  34.4  108  18  122/72      99%  Date/Time 9/26 14:20 9/26 14:20 9/26 14:20 9/26 14:20    9/26 14:20  Range  (34.4C - 34.4C )  (108 - 108 )  (18 - 18 )  (122 - 122 )/ (72 - 72  )    (99% - 99% )         Weights   9/26 14:20: Weight in kg (Weight (kg))  162  9/26 14:20: Weight in lbs ((lbs))  357.1  9/26 14:20: BMI (kg/m2) (BMI (kg/m2))  61.349    Physical Exam by System:    Constitutional: Well developed, awake/alert/oriented  x3, no distress, alert and cooperative   Eyes: PERRL, EOMI, clear sclera   ENMT: mucous membranes moist, no apparent injury,  no lesions seen   Head/Neck: Neck supple, no apparent injury, thyroid  without mass or tenderness, No JVD, trachea midline, no bruits   Respiratory/Thorax: Patent airways, CTAB, normal  breath sounds with good chest expansion, thorax symmetric   Cardiovascular: Regular, rate and rhythm, no murmurs,  2+ equal pulses of the extremities, normal S 1and S 2   Gastrointestinal: Nondistended, soft, non-tender,  no rebound tenderness or guarding, no masses palpable, no organomegaly, +BS, no bruits   Musculoskeletal: ROM intact, no joint swelling, normal  strength   Extremities: normal extremities, no cyanosis edema,  contusions or wounds, no clubbing   Neurological: alert and oriented x3, intact senses,  motor, response and reflexes, normal strength   Psychological: Appropriate mood and behavior   Skin: Warm and dry, no lesions, no rashes     Airway:  ·  Mouth Opening OK yes   ·  Neck Flexibility OK yes   ·  Loose Teeth no   ·  Snoring Hx/Sleep Apnea no   ·  NSAID/Aspirin Use yes     Airway Classification:  ·  Oropharyngeal Classification Class II   ·  Airway Image Comments No broken teeth, no dentures and no missing teeth     Recent Lab Results:    Results:        I have reviewed these laboratory results:    Basic Metabolic Panel  26-Sep-2023 14:53:00      Result Value    Glucose, Serum  96    NA  135   L   K  4.2    CL  106    Bicarbonate, Serum  22    Anion Gap, Serum  11    BUN  9    CREAT  0.73    GFR Female  >90    Calcium, Serum  9.2      Complete Blood Count + Differential  26-Sep-2023 14:53:00      Result Value    White Blood Cell Count  7.8     Red Blood Cell Count  4.92    HGB  12.6    HCT  41.3    MCV  84    MCHC  30.5   L   PLT  423    RDW-CV  15.5   H   Neutrophil %  56.6    Immature Granulocytes %  0.3    Lymphocyte %  29.1    Monocyte %  10.8    Eosinophil %  2.6    Basophil %  0.6    Neutrophil Count  4.42    Lymphocyte Count  2.27    Monocyte Count  0.84    Eosinophil Count  0.20    Basophil Count  0.05         Assessment and Plan:   Assessment:    Patient is a 38 year old AA female scheduled for excision of groin abscess on 10/4/23.  Patient is at acceptable risk to proceed with planned surgical procedure. Further cardiac risk stratification deferred at this time.    Plan    Neuro:    Migraines- instructed to continue current regimen.    Cardiovascular:    Patient denies any chest pain, tightness, heaviness, pressure, radiating pain, palpitations, irregular heartbeats, lightheadedness, cough,  congestion, shortness of breath, LYNCH, PND, near syncope, weight loss or gain.   Good functional capacity    EKG in [facility] on 9/26/23:  Normal sinus rhythm HR 91 bpm  Normal ECG    RCRI: 0  Risk of Mace: 3.9%  Caprini: 4  VTE risk: 1.7%    HTN- controlled on current regimen. Instructed to hold Losartan DOS.     Pulm:  Known or suspected TREVA is considered an independent risk factor for difficult mask ventilation, difficult intubation or both.  Increased  vigilance is recommended with the use of narcotics due to an increased risk for opioid induced respiratory depression.  The patient may benefit from continuous pulse oximetry to monitor for hypoxic events until baseline Sp02 is normal on room air.     Renal/endo:    DM II- instructed to hold Metformin and Basaglar DOS.       Heme:  Patient instructed to ambulate as soon as possible postoperatively to decrease thromboembolic risk.   Initiate mechanical DVT prophylaxis as soon as possible and initiate chemical prophylaxis when deemed safe from a bleeding standpoint post surgery.       Risk assessment complete.   Patient is scheduled for a low surgical risk procedure.   She is considered medically optimized for the planned procedure.      Labs/testing obtained in PAT on 9/26/23: CBC/DIFF, BMP, EKG.    Follow up: none      Preoperative medication instructions were provided and reviewed with the patient.  Any additional testing or evaluation was explained to the patient.  Nothing by mouth instructions were discussed and patient's questions were answered prior to conclusion  to this encounter.  Patient verbalized understanding of preoperative instructions given in preadmission testing; discharge instructions available in EMR.    This note was dictated with speech recognition.  Minor errors may have been detected during use of speech recognition.                Electronic Signatures:  Zena Han (APRN-CNP)  (Signed 26-Sep-2023 16:16)   Authored: History of Present Illness, Family History,  Social History, Allergies, Medications Prior to Admission, Review of Systems, Objective, Assessment and Plan, Note Completion      Last Updated: 26-Sep-2023 16:16 by Zena Han (APRN-CNP)

## 2023-10-04 ENCOUNTER — HOSPITAL ENCOUNTER (OUTPATIENT)
Facility: HOSPITAL | Age: 38
Setting detail: OUTPATIENT SURGERY
Discharge: HOME | End: 2023-10-04
Attending: SURGERY | Admitting: SURGERY
Payer: COMMERCIAL

## 2023-10-04 ENCOUNTER — ANESTHESIA (OUTPATIENT)
Dept: OPERATING ROOM | Facility: HOSPITAL | Age: 38
End: 2023-10-04
Payer: COMMERCIAL

## 2023-10-04 ENCOUNTER — ANESTHESIA EVENT (OUTPATIENT)
Dept: OPERATING ROOM | Facility: HOSPITAL | Age: 38
End: 2023-10-04
Payer: COMMERCIAL

## 2023-10-04 VITALS
BODY MASS INDEX: 50.02 KG/M2 | DIASTOLIC BLOOD PRESSURE: 76 MMHG | HEART RATE: 73 BPM | OXYGEN SATURATION: 100 % | RESPIRATION RATE: 20 BRPM | SYSTOLIC BLOOD PRESSURE: 126 MMHG | TEMPERATURE: 97.3 F | WEIGHT: 293 LBS | HEIGHT: 64 IN

## 2023-10-04 DIAGNOSIS — L02.234 CARBUNCLE OF GROIN: ICD-10-CM

## 2023-10-04 DIAGNOSIS — L02.234 CARBUNCLE OF GROIN: Primary | ICD-10-CM

## 2023-10-04 LAB
ABO GROUP (TYPE) IN BLOOD: NORMAL
ANTIBODY SCREEN: NORMAL
GLUCOSE BLD MANUAL STRIP-MCNC: 102 MG/DL (ref 74–99)
PREGNANCY TEST URINE, POC: NEGATIVE
RH FACTOR (ANTIGEN D): NORMAL

## 2023-10-04 PROCEDURE — A4217 STERILE WATER/SALINE, 500 ML: HCPCS | Performed by: SURGERY

## 2023-10-04 PROCEDURE — 7100000010 HC PHASE TWO TIME - EACH INCREMENTAL 1 MINUTE: Performed by: SURGERY

## 2023-10-04 PROCEDURE — 2580000001 HC RX 258 IV SOLUTIONS: Performed by: SURGERY

## 2023-10-04 PROCEDURE — 7100000009 HC PHASE TWO TIME - INITIAL BASE CHARGE: Performed by: SURGERY

## 2023-10-04 PROCEDURE — 2580000001 HC RX 258 IV SOLUTIONS: Performed by: NURSE ANESTHETIST, CERTIFIED REGISTERED

## 2023-10-04 PROCEDURE — 3600000008 HC OR TIME - EACH INCREMENTAL 1 MINUTE - PROCEDURE LEVEL THREE: Performed by: SURGERY

## 2023-10-04 PROCEDURE — A11406 PR EXC SKIN BENIG >4 CM TRUNK,ARM,LEG: Performed by: ANESTHESIOLOGY

## 2023-10-04 PROCEDURE — A11406 PR EXC SKIN BENIG >4 CM TRUNK,ARM,LEG: Performed by: NURSE ANESTHETIST, CERTIFIED REGISTERED

## 2023-10-04 PROCEDURE — 7100000002 HC RECOVERY ROOM TIME - EACH INCREMENTAL 1 MINUTE: Performed by: SURGERY

## 2023-10-04 PROCEDURE — 88304 TISSUE EXAM BY PATHOLOGIST: CPT | Mod: TC,AHULAB | Performed by: SURGERY

## 2023-10-04 PROCEDURE — 3700000001 HC GENERAL ANESTHESIA TIME - INITIAL BASE CHARGE: Performed by: SURGERY

## 2023-10-04 PROCEDURE — 2500000004 HC RX 250 GENERAL PHARMACY W/ HCPCS (ALT 636 FOR OP/ED): Performed by: SURGERY

## 2023-10-04 PROCEDURE — 3600000003 HC OR TIME - INITIAL BASE CHARGE - PROCEDURE LEVEL THREE: Performed by: SURGERY

## 2023-10-04 PROCEDURE — 2500000005 HC RX 250 GENERAL PHARMACY W/O HCPCS: Performed by: NURSE ANESTHETIST, CERTIFIED REGISTERED

## 2023-10-04 PROCEDURE — 2720000007 HC OR 272 NO HCPCS: Performed by: SURGERY

## 2023-10-04 PROCEDURE — 11406 EXC TR-EXT B9+MARG >4.0 CM: CPT | Performed by: SURGERY

## 2023-10-04 PROCEDURE — 82947 ASSAY GLUCOSE BLOOD QUANT: CPT

## 2023-10-04 PROCEDURE — 3700000002 HC GENERAL ANESTHESIA TIME - EACH INCREMENTAL 1 MINUTE: Performed by: SURGERY

## 2023-10-04 PROCEDURE — 86850 RBC ANTIBODY SCREEN: CPT | Performed by: SURGERY

## 2023-10-04 PROCEDURE — 2500000004 HC RX 250 GENERAL PHARMACY W/ HCPCS (ALT 636 FOR OP/ED): Performed by: NURSE ANESTHETIST, CERTIFIED REGISTERED

## 2023-10-04 PROCEDURE — 7100000001 HC RECOVERY ROOM TIME - INITIAL BASE CHARGE: Performed by: SURGERY

## 2023-10-04 PROCEDURE — 2500000001 HC RX 250 WO HCPCS SELF ADMINISTERED DRUGS (ALT 637 FOR MEDICARE OP): Performed by: ANESTHESIOLOGY

## 2023-10-04 PROCEDURE — 88304 TISSUE EXAM BY PATHOLOGIST: CPT | Performed by: PATHOLOGY

## 2023-10-04 PROCEDURE — 88304 TISSUE EXAM BY PATHOLOGIST: CPT | Mod: TC,SUR,AHULAB,CMCLAB | Performed by: SURGERY

## 2023-10-04 PROCEDURE — 36415 COLL VENOUS BLD VENIPUNCTURE: CPT | Performed by: SURGERY

## 2023-10-04 RX ORDER — HYDROMORPHONE HYDROCHLORIDE 1 MG/ML
1 INJECTION, SOLUTION INTRAMUSCULAR; INTRAVENOUS; SUBCUTANEOUS EVERY 5 MIN PRN
Status: DISCONTINUED | OUTPATIENT
Start: 2023-10-04 | End: 2023-10-04 | Stop reason: HOSPADM

## 2023-10-04 RX ORDER — CEFAZOLIN SODIUM 2 G/100ML
2 INJECTION, SOLUTION INTRAVENOUS ONCE
Status: DISCONTINUED | OUTPATIENT
Start: 2023-10-04 | End: 2023-10-04 | Stop reason: HOSPADM

## 2023-10-04 RX ORDER — OXYCODONE HYDROCHLORIDE 5 MG/1
5 TABLET ORAL EVERY 6 HOURS PRN
Qty: 12 TABLET | Refills: 0 | Status: SHIPPED | OUTPATIENT
Start: 2023-10-04 | End: 2023-10-24 | Stop reason: WASHOUT

## 2023-10-04 RX ORDER — LIDOCAINE HCL/PF 100 MG/5ML
SYRINGE (ML) INTRAVENOUS AS NEEDED
Status: DISCONTINUED | OUTPATIENT
Start: 2023-10-04 | End: 2023-10-04

## 2023-10-04 RX ORDER — SODIUM CHLORIDE, SODIUM LACTATE, POTASSIUM CHLORIDE, CALCIUM CHLORIDE 600; 310; 30; 20 MG/100ML; MG/100ML; MG/100ML; MG/100ML
100 INJECTION, SOLUTION INTRAVENOUS CONTINUOUS
Status: DISCONTINUED | OUTPATIENT
Start: 2023-10-04 | End: 2023-10-04 | Stop reason: HOSPADM

## 2023-10-04 RX ORDER — FENTANYL CITRATE 50 UG/ML
INJECTION, SOLUTION INTRAMUSCULAR; INTRAVENOUS AS NEEDED
Status: DISCONTINUED | OUTPATIENT
Start: 2023-10-04 | End: 2023-10-04

## 2023-10-04 RX ORDER — ONDANSETRON HYDROCHLORIDE 2 MG/ML
INJECTION, SOLUTION INTRAVENOUS AS NEEDED
Status: DISCONTINUED | OUTPATIENT
Start: 2023-10-04 | End: 2023-10-04

## 2023-10-04 RX ORDER — DIPHENHYDRAMINE HYDROCHLORIDE 50 MG/ML
12.5 INJECTION INTRAMUSCULAR; INTRAVENOUS ONCE AS NEEDED
Status: DISCONTINUED | OUTPATIENT
Start: 2023-10-04 | End: 2023-10-04 | Stop reason: HOSPADM

## 2023-10-04 RX ORDER — BUPIVACAINE HYDROCHLORIDE 5 MG/ML
INJECTION, SOLUTION EPIDURAL; INTRACAUDAL AS NEEDED
Status: DISCONTINUED | OUTPATIENT
Start: 2023-10-04 | End: 2023-10-04 | Stop reason: HOSPADM

## 2023-10-04 RX ORDER — MIDAZOLAM HYDROCHLORIDE 1 MG/ML
INJECTION, SOLUTION INTRAMUSCULAR; INTRAVENOUS AS NEEDED
Status: DISCONTINUED | OUTPATIENT
Start: 2023-10-04 | End: 2023-10-04

## 2023-10-04 RX ORDER — OXYCODONE HYDROCHLORIDE 5 MG/1
5 TABLET ORAL EVERY 4 HOURS PRN
Status: DISCONTINUED | OUTPATIENT
Start: 2023-10-04 | End: 2023-10-04 | Stop reason: HOSPADM

## 2023-10-04 RX ORDER — POLYETHYLENE GLYCOL 3350 17 G/17G
17 POWDER, FOR SOLUTION ORAL DAILY PRN
Qty: 10 PACKET | Refills: 0 | Status: SHIPPED | OUTPATIENT
Start: 2023-10-04 | End: 2023-10-24 | Stop reason: WASHOUT

## 2023-10-04 RX ORDER — ONDANSETRON HYDROCHLORIDE 2 MG/ML
4 INJECTION, SOLUTION INTRAVENOUS ONCE AS NEEDED
Status: DISCONTINUED | OUTPATIENT
Start: 2023-10-04 | End: 2023-10-04 | Stop reason: HOSPADM

## 2023-10-04 RX ORDER — PROPOFOL 10 MG/ML
INJECTION, EMULSION INTRAVENOUS AS NEEDED
Status: DISCONTINUED | OUTPATIENT
Start: 2023-10-04 | End: 2023-10-04

## 2023-10-04 RX ORDER — LABETALOL HYDROCHLORIDE 5 MG/ML
5 INJECTION, SOLUTION INTRAVENOUS ONCE AS NEEDED
Status: DISCONTINUED | OUTPATIENT
Start: 2023-10-04 | End: 2023-10-04 | Stop reason: HOSPADM

## 2023-10-04 RX ORDER — HYDRALAZINE HYDROCHLORIDE 20 MG/ML
5 INJECTION INTRAMUSCULAR; INTRAVENOUS EVERY 30 MIN PRN
Status: DISCONTINUED | OUTPATIENT
Start: 2023-10-04 | End: 2023-10-04 | Stop reason: HOSPADM

## 2023-10-04 RX ORDER — LIDOCAINE HYDROCHLORIDE 10 MG/ML
0.1 INJECTION, SOLUTION EPIDURAL; INFILTRATION; INTRACAUDAL; PERINEURAL ONCE
Status: DISCONTINUED | OUTPATIENT
Start: 2023-10-04 | End: 2023-10-04 | Stop reason: HOSPADM

## 2023-10-04 RX ORDER — SODIUM CHLORIDE 0.9 G/100ML
IRRIGANT IRRIGATION AS NEEDED
Status: DISCONTINUED | OUTPATIENT
Start: 2023-10-04 | End: 2023-10-04 | Stop reason: HOSPADM

## 2023-10-04 RX ORDER — CEFAZOLIN 1 G/1
INJECTION, POWDER, FOR SOLUTION INTRAVENOUS AS NEEDED
Status: DISCONTINUED | OUTPATIENT
Start: 2023-10-04 | End: 2023-10-04

## 2023-10-04 RX ADMIN — SODIUM CHLORIDE, POTASSIUM CHLORIDE, SODIUM LACTATE AND CALCIUM CHLORIDE 100 ML/HR: 600; 310; 30; 20 INJECTION, SOLUTION INTRAVENOUS at 10:48

## 2023-10-04 RX ADMIN — ONDANSETRON 4 MG: 2 INJECTION INTRAMUSCULAR; INTRAVENOUS at 12:55

## 2023-10-04 RX ADMIN — SODIUM CHLORIDE, SODIUM LACTATE, POTASSIUM CHLORIDE, AND CALCIUM CHLORIDE: 600; 310; 30; 20 INJECTION, SOLUTION INTRAVENOUS at 12:10

## 2023-10-04 RX ADMIN — OXYCODONE HYDROCHLORIDE 5 MG: 5 TABLET ORAL at 14:38

## 2023-10-04 RX ADMIN — MIDAZOLAM HYDROCHLORIDE 2 MG: 1 INJECTION, SOLUTION INTRAMUSCULAR; INTRAVENOUS at 12:19

## 2023-10-04 RX ADMIN — CEFAZOLIN SODIUM 2 G: 1 POWDER, FOR SOLUTION INTRAMUSCULAR; INTRAVENOUS at 12:32

## 2023-10-04 RX ADMIN — LIDOCAINE HYDROCHLORIDE 100 MG: 20 INJECTION, SOLUTION INTRAVENOUS at 12:27

## 2023-10-04 RX ADMIN — FENTANYL CITRATE 50 MCG: 50 INJECTION, SOLUTION INTRAMUSCULAR; INTRAVENOUS at 12:41

## 2023-10-04 RX ADMIN — DEXAMETHASONE SODIUM PHOSPHATE 8 MG: 4 INJECTION, SOLUTION INTRAMUSCULAR; INTRAVENOUS at 12:27

## 2023-10-04 RX ADMIN — PROPOFOL 250 MG: 10 INJECTION, EMULSION INTRAVENOUS at 12:27

## 2023-10-04 RX ADMIN — FENTANYL CITRATE 50 MCG: 50 INJECTION, SOLUTION INTRAMUSCULAR; INTRAVENOUS at 12:27

## 2023-10-04 ASSESSMENT — PAIN SCALES - GENERAL
PAINLEVEL_OUTOF10: 7
PAINLEVEL_OUTOF10: 0 - NO PAIN
PAINLEVEL_OUTOF10: 7
PAINLEVEL_OUTOF10: 0 - NO PAIN
PAINLEVEL_OUTOF10: 0 - NO PAIN
PAINLEVEL_OUTOF10: 3
PAINLEVEL_OUTOF10: 4
PAINLEVEL_OUTOF10: 7

## 2023-10-04 ASSESSMENT — PAIN - FUNCTIONAL ASSESSMENT
PAIN_FUNCTIONAL_ASSESSMENT: 0-10

## 2023-10-04 NOTE — OP NOTE
Abdominal Mass Excision Operative Note     Date: 10/4/2023  OR Location: [facility] A OR    Name: Mike Chase, : 1985, Age: 38 y.o., MRN: [id number], Sex: female    Diagnosis  Pre-op Diagnosis     * Carbuncle of groin [L02.234] Dermoid cyst abdominal wall 6 x 8 cm     Procedures    * Abdominal Mass Excision    Surgeons      * Max Aguilar - Primary    Resident/Fellow/Other Assistant:  PGY 3    Procedure Summary  Anesthesia: Monitor Anesthesia Care  ASA: III  Anesthesia Staff: Anesthesiologist: Cole Morocho MD  CRNA: THEODORE Connell-ZAYRA, FAHEEM  C-AA: TANA Huggins  Estimated Blood Loss: 10mL  Intra-op Medications: * No intraprocedure medications in log *           Anesthesia Record               Intraprocedure I/O Totals       None           Specimen:   ID Type Source Tests Collected by Time   1 : ABDOMINAL WALL DERMOID CYST Tissue SOFT TISSUE MASS RESECTION SURGICAL PATHOLOGY EXAM Max Aguilar MD 10/4/2023 1249        Staff:   Circulator: Monica Pérez RN  Relief Circulator: Arabella Salinas RN  Scrub Person: Cole Baker; Marcela Jesus LPN         Drains and/or Catheters: * None in log *    Tourniquet Times:         Implants:     Findings: 6x8cm dermoid subcutaneous mass    Indications: Mike Chase is an 38 y.o. female who is having surgery for Carbuncle of groin [L02.234].     The patient was seen in the preoperative area. The risks, benefits, complications, treatment options, non-operative alternatives, expected recovery and outcomes were discussed with the patient. The possibilities of reaction to medication, pulmonary aspiration, injury to surrounding structures, bleeding, recurrent infection, the need for additional procedures, failure to diagnose a condition, and creating a complication requiring transfusion or operation were discussed with the patient. The patient concurred with the proposed plan, giving informed consent.  The site of surgery was properly noted/marked if necessary  per policy. The patient has been actively warmed in preoperative area. Preoperative antibiotics have been ordered and given within 1 hours of incision. Venous thrombosis prophylaxis have been ordered including bilateral sequential compression devices    Procedure Details: Patient consented for wide excision of a intermittently infected dermoid cyst abdominal wall.  Risks and benefits were detailed informed consent was obtained.  Site was marked.    Brought to the OR placed supine.  Timeout was performed confirm patient procedure.  Antibiotics were given general anesthesia given through an LMA tube.  We prepped and draped sterilely.  Yanick transversely oriented elliptical lines around the mass. We then injected local anesthetic and then made sharp incision the skin with scalpel.  The mass was then excised en bloc down to the fascia using cautery.  Specimen was sent off the field.  We irrigated the wound make sure was completely hemostatic.  We then closed the deeper layer with 2-0 Vicryl.  Dermis reapproximated with 3-0 Vicryl and skin closed with a running 4-0 Monocryl subcuticular suture.  Dermabond was applied and she went to the recovery room extubated in satisfactory condition.      Complications:  None; patient tolerated the procedure well.    Disposition: PACU - hemodynamically stable.  Condition: stable         Additional Details:     Attending Attestation: I was present and scrubbed for the entire procedure.    Max Aguilar  Phone Number: [phone]

## 2023-10-04 NOTE — ANESTHESIA POSTPROCEDURE EVALUATION
Patient: Mike Chase    Procedure Summary       Date: 10/04/23 Room / Location: [facility] A OR 09 / Virtual [facility] A OR    Anesthesia Start: 1210 Anesthesia Stop: 1345    Procedure: Abdominal Mass Excision (Abdomen) Diagnosis:       Carbuncle of groin      (Carbuncle of groin [L02.234])    Surgeons: Max Aguilar MD Responsible Provider: Cole Morocho MD    Anesthesia Type: general ASA Status: 3            Anesthesia Type: general    Vitals Value Taken Time   /81 10/04/23 1400   Temp 36.4 °C (97.5 °F) 10/04/23 1346   Pulse 70 10/04/23 1400   Resp 28 10/04/23 1400   SpO2 99 % 10/04/23 1400       Anesthesia Post Evaluation    Patient location during evaluation: PACU  Patient participation: complete - patient participated  Level of consciousness: awake and alert  Pain management: satisfactory to patient  Multimodal analgesia pain management approach  Airway patency: patent  Cardiovascular status: acceptable and blood pressure returned to baseline  Respiratory status: acceptable  Hydration status: acceptable        No notable events documented.

## 2023-10-04 NOTE — ANESTHESIA PROCEDURE NOTES
Airway  Date/Time: 10/4/2023 12:27 PM  Urgency: elective    Airway not difficult    Staffing  Performed: CRNA   Authorized by: Cole Morocho MD    Performed by: THEODORE Connell-ZAYRA, FAHEEM  Patient location during procedure: OR    Indications and Patient Condition  Indications for airway management: anesthesia  Spontaneous Ventilation: absent  Sedation level: deep  Preoxygenated: yes  Patient position: ramp  Mask difficulty assessment: 1 - vent by mask    Final Airway Details  Final airway type: supraglottic airway      Successful airway: ProSeal  Size 5     Number of attempts at approach: 1

## 2023-10-04 NOTE — PERIOPERATIVE NURSING NOTE
1500: Patient arrived to Phase 2, [place] [facility], VSS    1505: Family at bedside    1515: Family assisting patient to get dressed and ready to go home.    1530: Discharge instructions reviewed with patient and daughter at bedside. Both acknowledged DC instructions and POC after DC. Patient's daughter and patient requesting a return to work note from nurse/doctor.    1535: Return to work letter provided    1540: Patient placed in transport    1551: Patient left PACU [place] [facility] for main lobby via  for home with daughter and her 5 yo son.

## 2023-10-04 NOTE — ANESTHESIA PREPROCEDURE EVALUATION
Patient: Mike Chase    Procedure Information       Anesthesia Start Date/Time: 10/04/23 1210    Procedure: Abdominal Mass Excision (Abdomen)    Location: [facility] OR 09 / Virtual [facility] A OR    Surgeons: Max Aguilar MD            Relevant Problems   Cardiovascular   (+) Murmur, cardiac      Endocrine   (+) Obesity   (+) Type 2 diabetes mellitus (CMS/HCC)      Neuro/Psych   (+) Anxiety and depression      Musculoskeletal   (+) Primary localized osteoarthritis of knee      Infectious Disease   (+) Carbuncle of groin       Clinical information reviewed:   Tobacco  Allergies  Meds   Med Hx  Surg Hx  OB Status  Fam Hx  Soc   Hx        NPO Detail:  NPO/Void Status  Date of Last Liquid: 10/04/23  Time of Last Liquid: 0800  Date of Last Solid: 10/03/23  Time of Last Solid: 1930  Time of Last Void: 1031         PHYSICAL EXAM    Anesthesia Plan    ASA 3     general     intravenous induction   Anesthetic plan and risks discussed with patient.    Plan discussed with CRNA.

## 2023-10-11 LAB
LABORATORY COMMENT REPORT: NORMAL
PATH REPORT.COMMENTS IMP SPEC: NORMAL
PATH REPORT.FINAL DX SPEC: NORMAL
PATH REPORT.GROSS SPEC: NORMAL
PATH REPORT.RELEVANT HX SPEC: NORMAL
PATH REPORT.TOTAL CANCER: NORMAL

## 2023-10-19 ENCOUNTER — APPOINTMENT (OUTPATIENT)
Dept: SURGERY | Facility: CLINIC | Age: 38
End: 2023-10-19
Payer: COMMERCIAL

## 2023-10-19 ENCOUNTER — OFFICE VISIT (OUTPATIENT)
Dept: SURGERY | Facility: CLINIC | Age: 38
End: 2023-10-19
Payer: COMMERCIAL

## 2023-10-19 VITALS
TEMPERATURE: 97.2 F | BODY MASS INDEX: 50.02 KG/M2 | WEIGHT: 293 LBS | SYSTOLIC BLOOD PRESSURE: 108 MMHG | DIASTOLIC BLOOD PRESSURE: 76 MMHG | HEART RATE: 87 BPM | HEIGHT: 64 IN | OXYGEN SATURATION: 98 %

## 2023-10-19 DIAGNOSIS — L72.0 INFECTED EPIDERMOID CYST: Primary | ICD-10-CM

## 2023-10-19 DIAGNOSIS — L08.9 INFECTED EPIDERMOID CYST: Primary | ICD-10-CM

## 2023-10-19 PROCEDURE — 3074F SYST BP LT 130 MM HG: CPT | Performed by: SURGERY

## 2023-10-19 PROCEDURE — 3078F DIAST BP <80 MM HG: CPT | Performed by: SURGERY

## 2023-10-19 PROCEDURE — 1036F TOBACCO NON-USER: CPT | Performed by: SURGERY

## 2023-10-19 PROCEDURE — 99024 POSTOP FOLLOW-UP VISIT: CPT | Performed by: SURGERY

## 2023-10-19 PROCEDURE — 4010F ACE/ARB THERAPY RXD/TAKEN: CPT | Performed by: SURGERY

## 2023-10-19 PROCEDURE — 3008F BODY MASS INDEX DOCD: CPT | Performed by: SURGERY

## 2023-10-19 ASSESSMENT — ENCOUNTER SYMPTOMS: DEPRESSION: 0

## 2023-10-19 ASSESSMENT — PAIN SCALES - GENERAL: PAINLEVEL: 0-NO PAIN

## 2023-10-19 NOTE — LETTER
We recently excised large inflammatory cystic mass on Ms Chase lower abdominal wall.  Final pathology was benign.  She is healing up nicely.  I will see her as needed.  Thanks again    [place]

## 2023-10-19 NOTE — PROGRESS NOTES
"Mike Chase is a 38 y.o. female on day 0 of admission presenting with No Principal Problem: There is no principal problem currently on the Problem List. Please update the Problem List and refresh..    Assessment/Plan   Excellent recovery following recent wide excision of inflammatory cystic mass in the lower abdominal wall.  She is cleared to resume activities without limitations.  She will follow-up with me as needed    Subjective   Ms. Chase just following up after recent wide excision of a chronically ruptured inflammatory cyst on the abdominal wall.  She feels well.  No drainage.  She is back to work.       Objective     Physical Exam  NAD  A&Ox3  Non icteric  CTA  RR  Abdomen soft min tender. Wounds clean, intact  Extremities warm, well perfused     Last Recorded Vitals  Blood pressure 108/76, pulse 87, temperature 36.2 °C (97.2 °F), height 1.626 m (5' 4\"), weight (!) 163 kg (360 lb), SpO2 98 %.  Intake/Output last 3 Shifts:  No intake/output data recorded.    Relevant Results    Scheduled medications    Continuous medications    PRN medications    Surgical Pathology Exam: [id number]  Order: [id number]  Collected 10/4/2023 12:49       Status: Final result       Visible to patient: Yes (seen)       Dx: Carbuncle of groin    0 Result Notes       Component  Resulting Agency   FINAL DIAGNOSIS   SKIN, ABDOMINAL WALL, EXCISION:  DERMAL CYSTIC CAVITY WITH DENSE LYMPHOPLASMACYTIC AND GRANULOMATOUS INFLAMMATION AND FIBROSIS, SEE COMMENT   Electronically signed by Citlaly Mccauley MD on 10/11/2023 at 0908   Comment  [facility]   The findings could represent a ruptured cyst; however, no keratin fragments or cyst lining are seen.  Clinicopathologic correlation is recommended.       [facility]   By the signature on this report, the individual or group listed as making the Final Interpretation/Diagnosis certifies that they have reviewed this case.    Clinical History  [facility]   Pre-op diagnosis:  Carbuncle of groin [L02.234]   Gross " Description  [facility]    A:   Received in formalin, labeled with the patient's name and hospital number, is an unoriented elliptical segment of skin, and subcutaneous tissue measuring 8.0 x 4.2 x 4.0 cm.  The skin surface is unremarkable. The deep and lateral margins are inked blue. The specimen is sectioned to reveal a pasty filled cyst measuring 2.2 x 1.0 x 0.5 cm.  A representative section is submitted in one cassette.  ELIZA            I spent 25 minutes in the professional and overall care of this patient.      Max Aguilar MD

## 2023-10-24 ENCOUNTER — OFFICE VISIT (OUTPATIENT)
Dept: PRIMARY CARE | Facility: CLINIC | Age: 38
End: 2023-10-24
Payer: COMMERCIAL

## 2023-10-24 VITALS — DIASTOLIC BLOOD PRESSURE: 83 MMHG | SYSTOLIC BLOOD PRESSURE: 118 MMHG | HEART RATE: 77 BPM

## 2023-10-24 DIAGNOSIS — Z79.4 TYPE 2 DIABETES MELLITUS WITHOUT COMPLICATION, WITH LONG-TERM CURRENT USE OF INSULIN (MULTI): Primary | ICD-10-CM

## 2023-10-24 DIAGNOSIS — R51.9 CHRONIC DAILY HEADACHE: ICD-10-CM

## 2023-10-24 DIAGNOSIS — F41.9 ANXIETY: ICD-10-CM

## 2023-10-24 DIAGNOSIS — G43.109 MIGRAINE WITH AURA AND WITHOUT STATUS MIGRAINOSUS, NOT INTRACTABLE: ICD-10-CM

## 2023-10-24 DIAGNOSIS — E11.9 TYPE 2 DIABETES MELLITUS WITHOUT COMPLICATION, WITH LONG-TERM CURRENT USE OF INSULIN (MULTI): Primary | ICD-10-CM

## 2023-10-24 LAB — POC HEMOGLOBIN A1C: 6 % (ref 4.2–6.5)

## 2023-10-24 PROCEDURE — 4010F ACE/ARB THERAPY RXD/TAKEN: CPT | Performed by: NURSE PRACTITIONER

## 2023-10-24 PROCEDURE — 3074F SYST BP LT 130 MM HG: CPT | Performed by: NURSE PRACTITIONER

## 2023-10-24 PROCEDURE — 83036 HEMOGLOBIN GLYCOSYLATED A1C: CPT | Performed by: NURSE PRACTITIONER

## 2023-10-24 PROCEDURE — 99214 OFFICE O/P EST MOD 30 MIN: CPT | Performed by: NURSE PRACTITIONER

## 2023-10-24 PROCEDURE — 3079F DIAST BP 80-89 MM HG: CPT | Performed by: NURSE PRACTITIONER

## 2023-10-24 PROCEDURE — 3008F BODY MASS INDEX DOCD: CPT | Performed by: NURSE PRACTITIONER

## 2023-10-24 PROCEDURE — 1036F TOBACCO NON-USER: CPT | Performed by: NURSE PRACTITIONER

## 2023-10-24 RX ORDER — METFORMIN HYDROCHLORIDE 500 MG/1
500 TABLET ORAL 2 TIMES DAILY
Qty: 180 TABLET | Refills: 3 | Status: SHIPPED | OUTPATIENT
Start: 2023-10-24 | End: 2024-10-18

## 2023-10-24 RX ORDER — HYDROXYZINE HYDROCHLORIDE 25 MG/1
25 TABLET, FILM COATED ORAL EVERY 8 HOURS PRN
Qty: 60 TABLET | Refills: 0 | Status: SHIPPED | OUTPATIENT
Start: 2023-10-24

## 2023-10-24 ASSESSMENT — ENCOUNTER SYMPTOMS
VISUAL CHANGE: 0
POLYPHAGIA: 0
BLURRED VISION: 0
WEAKNESS: 0
CONFUSION: 0
FATIGUE: 0
DIZZINESS: 0
TREMORS: 0
NERVOUS/ANXIOUS: 0
HEADACHES: 0
HUNGER: 0
SWEATS: 0
SEIZURES: 0
WEIGHT LOSS: 0
BLACKOUTS: 0
POLYDIPSIA: 0
SPEECH DIFFICULTY: 0

## 2023-10-24 NOTE — PROGRESS NOTES
Subjective   Patient ID: Mike Chase is a 38 y.o. female who presents for Diabetes.     T2DM  Last HgA1c: 6.0% on 10/24/2023  Current meds: metformin 500 mg BID, basaglar 35 units QHS  Home glucose monitoring: daily  - High: 118  - Low: 78  - Average: 80  Diet: Been eating more sweets last couple weeks. Water, fruits, vegetables, protein  Exercise: Once a week; walking  Statin: Atorvastatin 20 mg daily  ACEI: none  ASA: none  Last urine albumin: 3/16/23  Diabetic foot exam: 6/15/22, declined   Vision exam: 2023  Dental cleaning: years  Pneumovax: 2013    Denies polyuria, polydipsia, vision changes, yeast infections, or neuropathy.   Denies any episodes of hypoglycemia that she is aware of.    Review of Systems  Negative except as noted above      Objective   /83   Pulse 77   LMP  (LMP Unknown) Comment: IUD    Physical Exam  General: Alert and oriented, in no acute distress. Appears stated age, well-nourished, and well hydrated  HEENT:  - Head: Normocephalic and atraumatic   - Eyes: EOMI, PERRLA  - ENT: Hearing grossly intact  Heart: RRR. No murmurs, clicks, or rubs  Lungs: Unlabored breathing. CTAB with no crackles, wheezes, or rhonchi  Abdomen: Normal BS in all 4 quadrants. Soft, non-tender, non-distended, with no masses  Extremities: Warm and well perfused. No edema. Normal peripheral pulses  Musculoskeletal: Normal gait and station  Neurological: Alert and oriented. No gross neurological deficits  Psychological: Appropriate mood and affect  Skin: No rash, abnormal lesions, cyanosis, or erythema     Assessment/Plan     T2DM  - IO HgA1c: 6.0%  - Continue metformin 500 mg BIG, basaglar 35 units at bedtime  - Continue to monitor glucose at home  - Urine albumin due March 2024  - Declined foot exam  - Has appointment with eye doctor today  - Discussed lifestyle management     RTC in 3-4 months for chronic care or sooner as needed

## 2023-10-27 ENCOUNTER — APPOINTMENT (OUTPATIENT)
Dept: PRIMARY CARE | Facility: CLINIC | Age: 38
End: 2023-10-27
Payer: COMMERCIAL

## 2023-11-17 ENCOUNTER — TELEPHONE (OUTPATIENT)
Dept: PRIMARY CARE | Facility: CLINIC | Age: 38
End: 2023-11-17
Payer: COMMERCIAL

## 2023-11-17 DIAGNOSIS — E66.9 CLASS 2 OBESITY WITHOUT SERIOUS COMORBIDITY WITH BODY MASS INDEX (BMI) OF 35.0 TO 35.9 IN ADULT, UNSPECIFIED OBESITY TYPE: Primary | ICD-10-CM

## 2023-11-17 RX ORDER — PEN NEEDLE, DIABETIC 30 GX3/16"
1 NEEDLE, DISPOSABLE MISCELLANEOUS DAILY
Qty: 90 EACH | Refills: 3 | Status: SHIPPED | OUTPATIENT
Start: 2023-11-17

## 2023-11-17 NOTE — TELEPHONE ENCOUNTER
Patient states her insulin pen broke and she needs a box of insulin pens called over to [facility]

## 2023-11-22 ENCOUNTER — TELEPHONE (OUTPATIENT)
Dept: PRIMARY CARE | Facility: CLINIC | Age: 38
End: 2023-11-22
Payer: COMMERCIAL

## 2023-11-22 DIAGNOSIS — E11.9 TYPE 2 DIABETES MELLITUS WITHOUT COMPLICATION, WITHOUT LONG-TERM CURRENT USE OF INSULIN (MULTI): ICD-10-CM

## 2023-11-22 RX ORDER — INSULIN GLARGINE 100 [IU]/ML
35 INJECTION, SOLUTION SUBCUTANEOUS NIGHTLY
Qty: 15 ML | Refills: 0 | Status: SHIPPED | OUTPATIENT
Start: 2023-11-22 | End: 2023-12-15 | Stop reason: SDUPTHER

## 2023-11-22 NOTE — TELEPHONE ENCOUNTER
Patient states one of her insulin pens broke and she needs one box of insulin sent to [facility].

## 2023-12-15 DIAGNOSIS — G43.109 MIGRAINE WITH AURA AND WITHOUT STATUS MIGRAINOSUS, NOT INTRACTABLE: ICD-10-CM

## 2023-12-15 DIAGNOSIS — R51.9 CHRONIC DAILY HEADACHE: ICD-10-CM

## 2023-12-15 DIAGNOSIS — E11.9 TYPE 2 DIABETES MELLITUS WITHOUT COMPLICATION, WITHOUT LONG-TERM CURRENT USE OF INSULIN (MULTI): ICD-10-CM

## 2023-12-15 DIAGNOSIS — I10 BENIGN ESSENTIAL HYPERTENSION: ICD-10-CM

## 2023-12-15 RX ORDER — LOSARTAN POTASSIUM 25 MG/1
25 TABLET ORAL DAILY
Qty: 90 TABLET | Refills: 1 | Status: SHIPPED | OUTPATIENT
Start: 2023-12-15 | End: 2024-06-12

## 2023-12-15 RX ORDER — INSULIN GLARGINE 100 [IU]/ML
35 INJECTION, SOLUTION SUBCUTANEOUS NIGHTLY
Qty: 36 ML | Refills: 1 | Status: SHIPPED | OUTPATIENT
Start: 2023-12-15 | End: 2024-01-24 | Stop reason: SDUPTHER

## 2023-12-15 RX ORDER — TOPIRAMATE 50 MG/1
50 TABLET, FILM COATED ORAL 2 TIMES DAILY
Qty: 180 TABLET | Refills: 1 | Status: SHIPPED | OUTPATIENT
Start: 2023-12-15 | End: 2024-06-12

## 2023-12-15 RX ORDER — ATORVASTATIN CALCIUM 20 MG/1
20 TABLET, FILM COATED ORAL DAILY
Qty: 90 TABLET | Refills: 1 | Status: SHIPPED | OUTPATIENT
Start: 2023-12-15 | End: 2024-06-12

## 2024-01-24 DIAGNOSIS — E11.9 TYPE 2 DIABETES MELLITUS WITHOUT COMPLICATION, WITHOUT LONG-TERM CURRENT USE OF INSULIN (MULTI): ICD-10-CM

## 2024-01-24 RX ORDER — INSULIN GLARGINE 100 [IU]/ML
35 INJECTION, SOLUTION SUBCUTANEOUS NIGHTLY
Qty: 36 ML | Refills: 1 | Status: SHIPPED | OUTPATIENT
Start: 2024-01-24 | End: 2024-07-31

## 2024-01-30 ENCOUNTER — APPOINTMENT (OUTPATIENT)
Dept: RADIOLOGY | Facility: CLINIC | Age: 39
End: 2024-01-30
Payer: COMMERCIAL

## 2024-01-30 DIAGNOSIS — Z12.31 SCREENING MAMMOGRAM FOR BREAST CANCER: ICD-10-CM

## 2024-01-31 ENCOUNTER — HOSPITAL ENCOUNTER (OUTPATIENT)
Dept: RADIOLOGY | Facility: CLINIC | Age: 39
Discharge: HOME | End: 2024-01-31
Payer: COMMERCIAL

## 2024-01-31 DIAGNOSIS — Z12.31 SCREENING MAMMOGRAM FOR BREAST CANCER: ICD-10-CM

## 2024-01-31 PROCEDURE — 77067 SCR MAMMO BI INCL CAD: CPT | Performed by: RADIOLOGY

## 2024-01-31 PROCEDURE — 77063 BREAST TOMOSYNTHESIS BI: CPT | Performed by: RADIOLOGY

## 2024-01-31 PROCEDURE — 77067 SCR MAMMO BI INCL CAD: CPT

## 2024-02-13 ENCOUNTER — APPOINTMENT (OUTPATIENT)
Dept: PRIMARY CARE | Facility: CLINIC | Age: 39
End: 2024-02-13
Payer: COMMERCIAL

## 2024-02-27 ENCOUNTER — OFFICE VISIT (OUTPATIENT)
Dept: OBSTETRICS AND GYNECOLOGY | Facility: HOSPITAL | Age: 39
End: 2024-02-27
Payer: COMMERCIAL

## 2024-02-27 VITALS
BODY MASS INDEX: 50.02 KG/M2 | SYSTOLIC BLOOD PRESSURE: 137 MMHG | WEIGHT: 293 LBS | HEIGHT: 64 IN | DIASTOLIC BLOOD PRESSURE: 95 MMHG

## 2024-02-27 DIAGNOSIS — Z01.419 ENCOUNTER FOR GYNECOLOGICAL EXAMINATION WITHOUT ABNORMAL FINDING: Primary | ICD-10-CM

## 2024-02-27 DIAGNOSIS — N94.89 UTERINE CRAMPING: ICD-10-CM

## 2024-02-27 PROCEDURE — 87800 DETECT AGNT MULT DNA DIREC: CPT | Performed by: STUDENT IN AN ORGANIZED HEALTH CARE EDUCATION/TRAINING PROGRAM

## 2024-02-27 PROCEDURE — 99396 PREV VISIT EST AGE 40-64: CPT | Performed by: STUDENT IN AN ORGANIZED HEALTH CARE EDUCATION/TRAINING PROGRAM

## 2024-02-27 PROCEDURE — 87661 TRICHOMONAS VAGINALIS AMPLIF: CPT | Mod: 59 | Performed by: STUDENT IN AN ORGANIZED HEALTH CARE EDUCATION/TRAINING PROGRAM

## 2024-02-27 PROCEDURE — 3075F SYST BP GE 130 - 139MM HG: CPT | Performed by: STUDENT IN AN ORGANIZED HEALTH CARE EDUCATION/TRAINING PROGRAM

## 2024-02-27 PROCEDURE — 3080F DIAST BP >= 90 MM HG: CPT | Performed by: STUDENT IN AN ORGANIZED HEALTH CARE EDUCATION/TRAINING PROGRAM

## 2024-02-27 PROCEDURE — 1036F TOBACCO NON-USER: CPT | Performed by: STUDENT IN AN ORGANIZED HEALTH CARE EDUCATION/TRAINING PROGRAM

## 2024-02-27 PROCEDURE — 4010F ACE/ARB THERAPY RXD/TAKEN: CPT | Performed by: STUDENT IN AN ORGANIZED HEALTH CARE EDUCATION/TRAINING PROGRAM

## 2024-02-27 PROCEDURE — 3008F BODY MASS INDEX DOCD: CPT | Performed by: STUDENT IN AN ORGANIZED HEALTH CARE EDUCATION/TRAINING PROGRAM

## 2024-02-27 SDOH — ECONOMIC STABILITY: FOOD INSECURITY: WITHIN THE PAST 12 MONTHS, YOU WORRIED THAT YOUR FOOD WOULD RUN OUT BEFORE YOU GOT MONEY TO BUY MORE.: NEVER TRUE

## 2024-02-27 SDOH — ECONOMIC STABILITY: FOOD INSECURITY: WITHIN THE PAST 12 MONTHS, THE FOOD YOU BOUGHT JUST DIDN'T LAST AND YOU DIDN'T HAVE MONEY TO GET MORE.: NEVER TRUE

## 2024-02-27 ASSESSMENT — PATIENT HEALTH QUESTIONNAIRE - PHQ9
2. FEELING DOWN, DEPRESSED OR HOPELESS: SEVERAL DAYS
1. LITTLE INTEREST OR PLEASURE IN DOING THINGS: NOT AT ALL
SUM OF ALL RESPONSES TO PHQ9 QUESTIONS 1 & 2: 1
SUM OF ALL RESPONSES TO PHQ9 QUESTIONS 1 & 2: 1
10. IF YOU CHECKED OFF ANY PROBLEMS, HOW DIFFICULT HAVE THESE PROBLEMS MADE IT FOR YOU TO DO YOUR WORK, TAKE CARE OF THINGS AT HOME, OR GET ALONG WITH OTHER PEOPLE: SOMEWHAT DIFFICULT
1. LITTLE INTEREST OR PLEASURE IN DOING THINGS: NOT AT ALL
2. FEELING DOWN, DEPRESSED OR HOPELESS: SEVERAL DAYS

## 2024-02-27 ASSESSMENT — PAIN SCALES - GENERAL: PAINLEVEL: 0-NO PAIN

## 2024-02-27 NOTE — PROGRESS NOTES
Subjective   Mike Chase is a 39 y.o. female here for a routine exam. Current complaints: cramping seemingly random, otherwise well. Has not tried meds yet as the problem has been very intermittent.  Does not have regular menses with Nexplanon but some anovulatory breathrough on occasion.     Gynecologic History  No LMP recorded (lmp unknown). Patient has had an implant.  Contraception:  Nexplanon  Last Pap: 2020. Results were: normal  Last mammogram: 2024. Results were: normal    Obstetric History  OB History    Para Term  AB Living   4 3 3   1 3   SAB IAB Ectopic Multiple Live Births     1     3      # Outcome Date GA Lbr Shay/2nd Weight Sex Delivery Anes PTL Lv   4 Term 05/15/17    M Vag-Spont EPI N IRVING   3 Term 05    M Vag-Spont EPI N IRVING   2 Term 98    F Vag-Spont EPI N IRVING   1 IAB                Objective   Physical Exam  Vitals and nursing note reviewed.   Constitutional:       Appearance: Normal appearance.   HENT:      Head: Normocephalic and atraumatic.      Nose: Nose normal.      Mouth/Throat:      Mouth: Mucous membranes are moist.   Eyes:      Extraocular Movements: Extraocular movements intact.      Conjunctiva/sclera: Conjunctivae normal.   Pulmonary:      Effort: Pulmonary effort is normal.   Chest:      Chest wall: No deformity or swelling.   Breasts:     Breasts are symmetrical.      Right: Normal.      Left: Normal.   Abdominal:      General: There is no distension.      Palpations: Abdomen is soft. There is no mass.      Tenderness: There is no abdominal tenderness.   Genitourinary:     General: Normal vulva.      Vagina: Normal.      Cervix: Normal.      Uterus: Normal.       Adnexa: Right adnexa normal and left adnexa normal.      Rectum: Normal.   Musculoskeletal:      Cervical back: Normal range of motion.   Skin:     General: Skin is warm and dry.   Neurological:      General: No focal deficit present.      Mental Status: She is alert.   Psychiatric:          Mood and Affect: Mood normal.         Behavior: Behavior normal.         Thought Content: Thought content normal.         Judgment: Judgment normal.          Assessment/Plan   Healthy female exam.    Reviewed breakthrough bleeding with nexplanon and associated cramping are likely related to benign etiology and pelvic US to assess for structural etiology is reasonable.  Reviewed recommendation for NSAID/acetaminophen use for pain as needed.    Health screens up to date with exception of STI panel which was ordered today.    Follow up for well care or as needed    Ness Castillo MD

## 2024-02-28 ENCOUNTER — LAB (OUTPATIENT)
Dept: LAB | Facility: LAB | Age: 39
End: 2024-02-28
Payer: COMMERCIAL

## 2024-02-28 DIAGNOSIS — Z01.419 ENCOUNTER FOR GYNECOLOGICAL EXAMINATION WITHOUT ABNORMAL FINDING: ICD-10-CM

## 2024-02-28 LAB
C TRACH RRNA SPEC QL NAA+PROBE: NEGATIVE
HBV SURFACE AG SERPL QL IA: NONREACTIVE
HCV AB SER QL: NONREACTIVE
HIV 1+2 AB+HIV1 P24 AG SERPL QL IA: NONREACTIVE
N GONORRHOEA DNA SPEC QL PROBE+SIG AMP: NEGATIVE
T VAGINALIS RRNA SPEC QL NAA+PROBE: NEGATIVE

## 2024-02-28 PROCEDURE — 86780 TREPONEMA PALLIDUM: CPT

## 2024-02-28 PROCEDURE — 87389 HIV-1 AG W/HIV-1&-2 AB AG IA: CPT

## 2024-02-28 PROCEDURE — 36415 COLL VENOUS BLD VENIPUNCTURE: CPT

## 2024-02-28 PROCEDURE — 86803 HEPATITIS C AB TEST: CPT

## 2024-02-28 PROCEDURE — 87340 HEPATITIS B SURFACE AG IA: CPT

## 2024-02-28 ASSESSMENT — ENCOUNTER SYMPTOMS
BLACKOUTS: 0
POLYDIPSIA: 0
SPEECH DIFFICULTY: 0
DIZZINESS: 0
POLYPHAGIA: 0
HEADACHES: 0
CONFUSION: 0
WEIGHT LOSS: 0
SEIZURES: 0
BLURRED VISION: 0
SWEATS: 0
WEAKNESS: 0
NERVOUS/ANXIOUS: 0
FATIGUE: 0
TREMORS: 0
VISUAL CHANGE: 0
HUNGER: 0

## 2024-02-28 NOTE — PATIENT INSTRUCTIONS
Ernie Ms. Mckeon,    So raul to see you again yesterday.  Friendly note about max doses of ibuprofen and tylenol for your reference, in case you'd like to try these when you're cramping:    Ibuprofen: 800 mg every 6 hours is maximum dose.  OK to use 400 or 600 if these smaller doses are adequate to control pain, but may take up to 800.    Tylenol (acetaminophen): 1000 mg every 6 hours.  Taking at the same time as ibuprofen may actually increase effectiveness of ibuprofen.  1000 mg every 6 hours is the absolute maximum dose.  Even a little bit too much tylenol can cause liver damage so do not exceed this dose.  OK to use less if smaller doses (325 mg, 500 mg, or 650 mg) are adequate to control pain, but can use up to 1000 mg.    Hope that's helpful.  Thanks!    ST

## 2024-02-29 ENCOUNTER — OFFICE VISIT (OUTPATIENT)
Dept: PRIMARY CARE | Facility: CLINIC | Age: 39
End: 2024-02-29
Payer: COMMERCIAL

## 2024-02-29 VITALS
SYSTOLIC BLOOD PRESSURE: 140 MMHG | WEIGHT: 293 LBS | BODY MASS INDEX: 50.02 KG/M2 | HEIGHT: 64 IN | HEART RATE: 65 BPM | DIASTOLIC BLOOD PRESSURE: 86 MMHG

## 2024-02-29 DIAGNOSIS — F32.A ANXIETY AND DEPRESSION: ICD-10-CM

## 2024-02-29 DIAGNOSIS — G43.109 MIGRAINE WITH AURA AND WITHOUT STATUS MIGRAINOSUS, NOT INTRACTABLE: ICD-10-CM

## 2024-02-29 DIAGNOSIS — E11.9 TYPE 2 DIABETES MELLITUS WITHOUT COMPLICATION, WITH LONG-TERM CURRENT USE OF INSULIN (MULTI): Primary | ICD-10-CM

## 2024-02-29 DIAGNOSIS — F41.9 ANXIETY AND DEPRESSION: ICD-10-CM

## 2024-02-29 DIAGNOSIS — Z79.4 TYPE 2 DIABETES MELLITUS WITHOUT COMPLICATION, WITH LONG-TERM CURRENT USE OF INSULIN (MULTI): Primary | ICD-10-CM

## 2024-02-29 LAB
POC HEMOGLOBIN A1C: 6 % (ref 4.2–6.5)
TREPONEMA PALLIDUM IGG+IGM AB [PRESENCE] IN SERUM OR PLASMA BY IMMUNOASSAY: NONREACTIVE

## 2024-02-29 PROCEDURE — 99214 OFFICE O/P EST MOD 30 MIN: CPT | Performed by: NURSE PRACTITIONER

## 2024-02-29 PROCEDURE — 83036 HEMOGLOBIN GLYCOSYLATED A1C: CPT | Performed by: NURSE PRACTITIONER

## 2024-02-29 PROCEDURE — 1036F TOBACCO NON-USER: CPT | Performed by: NURSE PRACTITIONER

## 2024-02-29 PROCEDURE — 4010F ACE/ARB THERAPY RXD/TAKEN: CPT | Performed by: NURSE PRACTITIONER

## 2024-02-29 PROCEDURE — 3008F BODY MASS INDEX DOCD: CPT | Performed by: NURSE PRACTITIONER

## 2024-02-29 PROCEDURE — 3077F SYST BP >= 140 MM HG: CPT | Performed by: NURSE PRACTITIONER

## 2024-02-29 PROCEDURE — 3079F DIAST BP 80-89 MM HG: CPT | Performed by: NURSE PRACTITIONER

## 2024-02-29 RX ORDER — ALPRAZOLAM 0.5 MG/1
TABLET ORAL
Qty: 2 TABLET | Refills: 0 | Status: SHIPPED | OUTPATIENT
Start: 2024-02-29

## 2024-02-29 RX ORDER — IBUPROFEN 800 MG/1
800 TABLET ORAL EVERY 8 HOURS PRN
Qty: 90 TABLET | Refills: 0 | Status: SHIPPED | OUTPATIENT
Start: 2024-02-29

## 2024-02-29 ASSESSMENT — ENCOUNTER SYMPTOMS
OCCASIONAL FEELINGS OF UNSTEADINESS: 0
LOSS OF SENSATION IN FEET: 0
DEPRESSION: 0

## 2024-02-29 NOTE — PROGRESS NOTES
"Subjective   Patient ID: Mike Chase is a 39 y.o. female who presents for diabetes check.    HPI   Reports she had a lot of dental work recently and took hydroxyzine before but it did not help her anxiety. She has 2 more dental procedures left and asking for something else for anxiety.    T2DM  Last HgA1c: 6.0% on 10/24/2023  Current meds: metformin 500 mg BID, basaglar 35 units QHS  Home glucose monitoring: daily, usually about 80 fasting glucose  - High: 95  - Low: 80  - Average: 85  Diet: Been eating more sweets last couple weeks. Water, fruits, vegetables, protein  Exercise: Once a week; walking  Statin: Atorvastatin 20 mg daily  ACEI: none  ASA: none  Last urine albumin: 3/16/23  Diabetic foot exam: 6/15/22, declined   Vision exam: 2023  Dental cleaning: Had a lot of dental work recently, scheduled for a cleaning in March  Pneumovax: 2013    Denies polyuria, polydipsia, vision changes, yeast infections, or neuropathy.   Denies any episodes of hypoglycemia that she is aware of.    Review of Systems  Negative except as noted above    Objective   Blood pressure 140/86, pulse 65, height 1.626 m (5' 4\"), weight (!) 155 kg (342 lb).    Physical Exam  General: Alert and oriented, in no acute distress. Appears stated age, well-nourished, and well hydrated  HEENT:  - Head: Normocephalic and atraumatic   - Eyes: EOMI, PERRLA  - ENT: Hearing grossly intact  Heart: RRR. No murmurs, clicks, or rubs  Lungs: Unlabored breathing. CTAB with no crackles, wheezes, or rhonchi  Abdomen: Normal BS in all 4 quadrants. Soft, non-tender, non-distended, with no masses  Extremities: Warm and well perfused. No edema. Normal peripheral pulses  Musculoskeletal: Normal gait and station  Neurological: Alert and oriented. No gross neurological deficits  Psychological: Appropriate mood and affect  Skin: No rash, abnormal lesions, cyanosis, or erythema     Assessment/Plan   T2DM  - IO HgA1c: 6.0% today  - Continue metformin 500 mg BIG, basaglar " 35 units at bedtime  - Continue to monitor glucose at home  - Urine albumin due March 2024  - Declined foot exam  - Discussed lifestyle management     Anxiety over dental procedures  - Hydroxyzine did not work, order for alprazolam     RTC in June for chronic care or sooner as needed

## 2024-02-29 NOTE — PROGRESS NOTES
I was present with the APRN student who participated in the documentation of this note. I have personally seen and re-examined the patient and performed the medical decision-making components (assessment and plan of care). I have reviewed the APRN student documentation and verified the findings in the note as written with additions or exceptions as stated in the body of this note.    OARRS reviewed, no red flags. Rx sent for alprazolam 0.5 mg prior to dental procedure. #2 sent.        Kiara Trent, APRN-CNP

## 2024-03-14 ENCOUNTER — HOSPITAL ENCOUNTER (OUTPATIENT)
Dept: RADIOLOGY | Facility: HOSPITAL | Age: 39
Discharge: HOME | End: 2024-03-14
Payer: COMMERCIAL

## 2024-03-14 DIAGNOSIS — N94.89 UTERINE CRAMPING: ICD-10-CM

## 2024-03-14 PROCEDURE — 76856 US EXAM PELVIC COMPLETE: CPT

## 2024-03-14 PROCEDURE — 76830 TRANSVAGINAL US NON-OB: CPT | Performed by: RADIOLOGY

## 2024-03-14 PROCEDURE — 76857 US EXAM PELVIC LIMITED: CPT | Performed by: RADIOLOGY

## 2024-03-29 ENCOUNTER — TELEPHONE (OUTPATIENT)
Dept: PRIMARY CARE | Facility: CLINIC | Age: 39
End: 2024-03-29
Payer: COMMERCIAL

## 2024-04-01 ENCOUNTER — TELEMEDICINE (OUTPATIENT)
Dept: PRIMARY CARE | Facility: CLINIC | Age: 39
End: 2024-04-01
Payer: COMMERCIAL

## 2024-04-01 DIAGNOSIS — J01.00 ACUTE NON-RECURRENT MAXILLARY SINUSITIS: Primary | ICD-10-CM

## 2024-04-01 DIAGNOSIS — J40 BRONCHITIS: ICD-10-CM

## 2024-04-01 PROCEDURE — 3008F BODY MASS INDEX DOCD: CPT | Performed by: PHYSICIAN ASSISTANT

## 2024-04-01 PROCEDURE — 4010F ACE/ARB THERAPY RXD/TAKEN: CPT | Performed by: PHYSICIAN ASSISTANT

## 2024-04-01 PROCEDURE — 99213 OFFICE O/P EST LOW 20 MIN: CPT | Performed by: PHYSICIAN ASSISTANT

## 2024-04-01 RX ORDER — AZITHROMYCIN 250 MG/1
TABLET, FILM COATED ORAL
Qty: 6 TABLET | Refills: 0 | Status: SHIPPED | OUTPATIENT
Start: 2024-04-01 | End: 2024-04-06

## 2024-04-01 RX ORDER — ALBUTEROL SULFATE 90 UG/1
2 AEROSOL, METERED RESPIRATORY (INHALATION) EVERY 4 HOURS PRN
Qty: 18 G | Refills: 0 | Status: SHIPPED | OUTPATIENT
Start: 2024-04-01 | End: 2024-05-01

## 2024-04-01 RX ORDER — BENZONATATE 200 MG/1
200 CAPSULE ORAL 3 TIMES DAILY PRN
Qty: 42 CAPSULE | Refills: 0 | Status: SHIPPED | OUTPATIENT
Start: 2024-04-01 | End: 2024-04-23 | Stop reason: ALTCHOICE

## 2024-04-01 NOTE — PROGRESS NOTES
An interactive audio and video telecommunication system which permits real time communications between the patient (at the originating site) and provider (at the distant site) was utilized to provide this telehealth service.   Verbal consent was requested and obtained from Mike Chase on this date, 04/01/24 for a telehealth visit.     Subjective    Mike Chase is a 39 y.o. year old female patient presenting for virtual visit   Chief Complaint   Patient presents with    Cough      Cough started 1 week ago. 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 Difficulty speaking without coughing. Coughing is improving with meds, but still coughing up mucus. Increased quantity of mucus x 2 days. Bright red streaking of blood in mucus x yesterday. Has cleared up since.   Has had bronchitis before. Hx diabetes. Wants to avoid steroids.  Her child had influenza 3-4 weeks ago.     Using theraflu and lemon tea & mucinex cough & congestion max    Patient Active Problem List   Diagnosis    Blurry vision    Breakthrough bleeding on Nexplanon    Chronic allergic conjunctivitis    Headache, migraine    Internal derangement of knee    Murmur, cardiac    Obesity    Primary localized osteoarthritis of knee    Sickle cell trait (CMS/HCC)    Type 2 diabetes mellitus (CMS/HCC)    Vitamin D deficiency    Cervicalgia    Lumbago without sciatica    Anxiety and depression    Carbuncle of groin       Past Medical History:   Diagnosis Date    Allergic     Allergic rhinitis due to pollen 05/26/2016    Allergic rhinitis due to pollen    Arthritis     Contact with and (suspected) exposure to infections with a predominantly sexual mode of transmission 05/12/2014    Exposure to STD    Depression     Dysuria 05/13/2016    Dysuria    Encounter for routine postpartum follow-up 06/22/2017    Postpartum care and examination    Encounter for supervision of normal pregnancy, unspecified, first trimester 03/07/2017    Prenatal care in first trimester    Encounter for  supervision of normal pregnancy, unspecified, third trimester 05/11/2017    Prenatal care in third trimester    Heart murmur     Maternal care for (suspected) fetal abnormality and damage, unspecified, not applicable or unspecified 01/24/2017    Known or suspected fetal anomaly, antepartum    Migraine, unspecified, not intractable, without status migrainosus 07/22/2016    Headache, migraine    Moderate cervical dysplasia 05/08/2014    CHRISTY II (cervical intraepithelial neoplasia II)    Obesity complicating pregnancy, unspecified trimester 05/11/2017    Obesity in pregnancy    Other chronic diseases of tonsils and adenoids 09/13/2016    Tonsil stone    Other specified disorders of amniotic fluid and membranes, first trimester, not applicable or unspecified 11/01/2016    Subchorionic hemorrhage in first trimester    Personal history of other diseases of the digestive system 03/08/2016    History of constipation    Personal history of other diseases of the digestive system 03/08/2016    History of constipation    Personal history of other diseases of the female genital tract 04/27/2018    History of amenorrhea    Personal history of other endocrine, nutritional and metabolic disease 07/07/2017    History of vitamin D deficiency    Personal history of other specified conditions 03/18/2016    History of nausea    Personal history of other specified conditions 03/18/2016    History of abdominal pain    Personal history of urinary (tract) infections 04/27/2016    History of recurrent urinary tract infection    Personal history of urinary (tract) infections 04/27/2016    History of urinary tract infection    Prediabetes 04/22/2016    Prediabetes    Unspecified diabetes mellitus in pregnancy, unspecified trimester 11/01/2016    Pregnancy diabetes      Past Surgical History:   Procedure Laterality Date    CERVICAL BIOPSY  W/ LOOP ELECTRODE EXCISION      Cervical Loop Electrosurgical Excision (LEEP)    CYST REMOVAL  10/04/2023     Dermal cystic    KNEE ARTHROSCOPY W/ MENISCAL REPAIR  10/2018    WISDOM TOOTH EXTRACTION        Family History   Problem Relation Name Age of Onset    Breast cancer Mother Mom     Diabetes Mother Mom     Early natural death Mother Mom     Hypertension Mother Mom     Breast cancer Mother's Sister Cordelia     Diabetes Mother's Sister Cordelia     Diabetes Maternal Grandmother Mick     Asthma Son Rudi     Asthma Daughter Maranda     Depression Daughter Maranda     Mental illness Brother Brother       Social History     Tobacco Use    Smoking status: Never    Smokeless tobacco: Never   Substance Use Topics    Alcohol use: Yes     Alcohol/week: 1.0 standard drink of alcohol     Types: 1 Standard drinks or equivalent per week     Comment: occasionally        Current Outpatient Medications:     albuterol (Ventolin HFA) 90 mcg/actuation inhaler, Inhale 2 puffs every 4 hours if needed for wheezing or shortness of breath., Disp: 18 g, Rfl: 0    ALPRAZolam (Xanax) 0.5 mg tablet, Take 1 tablet an hour before dental procedure, Disp: 2 tablet, Rfl: 0    atorvastatin (Lipitor) 20 mg tablet, Take 1 tablet (20 mg) by mouth once daily., Disp: 90 tablet, Rfl: 1    azithromycin (Zithromax) 250 mg tablet, Take 2 tablets (500 mg) by mouth once daily for 1 day, THEN 1 tablet (250 mg) once daily for 4 days. Take 2 tabs (500 mg) by mouth today, than 1 daily for 4 days.., Disp: 6 tablet, Rfl: 0    benzonatate (Tessalon) 200 mg capsule, Take 1 capsule (200 mg) by mouth 3 times a day as needed for cough. Do not crush or chew., Disp: 42 capsule, Rfl: 0    blood sugar diagnostic (Accu-Chek Guide test strips) strip, Use to check blood sugar 4 times daily, Disp: 200 strip, Rfl: 3    etonogestrel-eluting contraceptive (Nexplanon) 68 mg implant implant, Placed 3/30/2021, Disp: , Rfl:     glucagon 1 mg injection, Inject 1 mg into the shoulder, thigh, or buttocks 1 time if needed for low blood sugar - see comments (blood sugar under 70) for up to 4  "doses. USE AS DIRECTED., Disp: 2 each, Rfl: 1    hydrOXYzine HCL (Atarax) 25 mg tablet, Take 1 tablet (25 mg) by mouth every 8 hours if needed for anxiety., Disp: 60 tablet, Rfl: 0    ibuprofen 800 mg tablet, Take 1 tablet (800 mg) by mouth every 8 hours if needed for moderate pain (4 - 6)., Disp: 90 tablet, Rfl: 0    insulin glargine (Basaglar KwikPen U-100 Insulin) 100 unit/mL (3 mL) pen, Inject 35 Units under the skin once daily at bedtime., Disp: 36 mL, Rfl: 1    losartan (Cozaar) 25 mg tablet, Take 1 tablet (25 mg) by mouth once daily., Disp: 90 tablet, Rfl: 1    metFORMIN (Glucophage) 500 mg tablet, Take 1 tablet (500 mg) by mouth 2 times a day. Take with food., Disp: 180 tablet, Rfl: 3    pen needle, diabetic (BD Ultra-Fine Short Pen Needle) 31 gauge x 5/16\" needle, Inject 1 each under the skin once daily. 31G x 8MM; use once daily, Disp: 90 each, Rfl: 3    topiramate (Topamax) 50 mg tablet, Take 1 tablet (50 mg) by mouth 2 times a day., Disp: 180 tablet, Rfl: 1    ubrogepant (Ubrelvy) 100 mg tablet tablet, Take 1 tablet (100 mg) by mouth 1 time if needed (migraine. May repeat in 2 hours if symptoms persist or return. Max dose 200 mg/24 hr) for up to 16 doses., Disp: 16 tablet, Rfl: 0     Review of Systems    Review of Systems:   Constitutional: Denies fever  HEENT: Denies ST, earache  CVS: Denies Chest pain  Pulmonary: Denies wheezing, SOB  GI: Denies N/V  : Denies dysuria  Musculoskeletal:  Denies myalgia  Neuro: Denies focal weakness or numbness.  Skin: Denies Rashes.  *Review of Systems is negative unless otherwise mentioned in HPI or ROS above.     Objective    VITALS  Pt does not have vitals available at time of visit.    Exam       Limited physical exam in virtual platform  Nontoxic. No acute distress.  Nonlabored breathing.    Assessment/Plan      Problem List Items Addressed This Visit    None  Visit Diagnoses       Acute non-recurrent maxillary sinusitis    -  Primary    Relevant Medications    " azithromycin (Zithromax) 250 mg tablet    Bronchitis        Relevant Medications    albuterol (Ventolin HFA) 90 mcg/actuation inhaler    azithromycin (Zithromax) 250 mg tablet    benzonatate (Tessalon) 200 mg capsule                   DISCHARGE    Please schedule a follow up visit     Any prescriptions were sent to the pharmacy, please make sure to pick them up and call if there are any issues or questions.     Thank you for trusting me to be a part of your healthcare.    Take care!     dEith Lopez PA-C  [facility]  [phone] [phone]     Please feel free to call the office, or return a message if you have any questions or concerns.

## 2024-04-09 ENCOUNTER — HOSPITAL ENCOUNTER (OUTPATIENT)
Dept: RADIOLOGY | Facility: HOSPITAL | Age: 39
Discharge: HOME | End: 2024-04-09
Payer: COMMERCIAL

## 2024-04-09 ENCOUNTER — OFFICE VISIT (OUTPATIENT)
Dept: ORTHOPEDIC SURGERY | Facility: HOSPITAL | Age: 39
End: 2024-04-09
Payer: COMMERCIAL

## 2024-04-09 VITALS — BODY MASS INDEX: 50.02 KG/M2 | HEIGHT: 64 IN | WEIGHT: 293 LBS

## 2024-04-09 DIAGNOSIS — M79.672 LEFT FOOT PAIN: ICD-10-CM

## 2024-04-09 DIAGNOSIS — S93.602A SPRAIN OF FOOT, LEFT, INITIAL ENCOUNTER: ICD-10-CM

## 2024-04-09 DIAGNOSIS — S92.345A CLOSED NONDISPLACED FRACTURE OF FOURTH METATARSAL BONE OF LEFT FOOT, INITIAL ENCOUNTER: Primary | ICD-10-CM

## 2024-04-09 PROCEDURE — 3008F BODY MASS INDEX DOCD: CPT | Performed by: SPECIALIST/TECHNOLOGIST

## 2024-04-09 PROCEDURE — 99214 OFFICE O/P EST MOD 30 MIN: CPT | Performed by: SPECIALIST/TECHNOLOGIST

## 2024-04-09 PROCEDURE — L4361 PNEUMA/VAC WALK BOOT PRE OTS: HCPCS | Performed by: SPECIALIST/TECHNOLOGIST

## 2024-04-09 PROCEDURE — 73630 X-RAY EXAM OF FOOT: CPT | Mod: LT

## 2024-04-09 PROCEDURE — E0114 CRUTCH UNDERARM PAIR NO WOOD: HCPCS | Performed by: SPECIALIST/TECHNOLOGIST

## 2024-04-09 PROCEDURE — 4010F ACE/ARB THERAPY RXD/TAKEN: CPT | Performed by: SPECIALIST/TECHNOLOGIST

## 2024-04-09 PROCEDURE — 1036F TOBACCO NON-USER: CPT | Performed by: SPECIALIST/TECHNOLOGIST

## 2024-04-09 PROCEDURE — 73630 X-RAY EXAM OF FOOT: CPT | Mod: LEFT SIDE | Performed by: RADIOLOGY

## 2024-04-09 PROCEDURE — 99204 OFFICE O/P NEW MOD 45 MIN: CPT | Performed by: SPECIALIST/TECHNOLOGIST

## 2024-04-09 ASSESSMENT — PAIN SCALES - GENERAL: PAINLEVEL_OUTOF10: 8

## 2024-04-09 ASSESSMENT — PAIN DESCRIPTION - DESCRIPTORS: DESCRIPTORS: THROBBING

## 2024-04-09 ASSESSMENT — PAIN - FUNCTIONAL ASSESSMENT: PAIN_FUNCTIONAL_ASSESSMENT: 0-10

## 2024-04-09 NOTE — PROGRESS NOTES
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  She reports hearing a \"pop\" sensation.  Today, she reports a throbbing pain over the lateral aspect of her foot that increases with walking and weightbearing.  She has taken 800 mg of ibuprofen since time of injury.  She denies any prior left foot injuries.  She denies any numbness or tingling in the foot or toes.  She presents for treatment recommendations.    Objective     ROS:  Constitutional: No fever, no chills, not feeling tired, no recent weight gain and no recent weight loss  ENT: No nosebleeds  Cardiovascular: No chest pain  Respiratory: No shortness of breath and no cough  Gastrointestinal: No abdominal pain, no nausea, no diarrhea, and no vomiting  Musculoskeletal: Positive for left foot pain  Integumentary: No rashes and no skin lesions  Neurological: No headache  Psychiatric: No sleep disturbances no depression  Endocrine: No muscle weakness and no muscle cramps  Hematologic/lymphatic: No swelling glands and no tendency for easy bruising    Past Medical History:   Diagnosis Date    Allergic     Allergic rhinitis due to pollen 05/26/2016    Allergic rhinitis due to pollen    Arthritis     Contact with and (suspected) exposure to infections with a predominantly sexual mode of transmission 05/12/2014    Exposure to STD    Depression     Dysuria 05/13/2016    Dysuria    Encounter for routine postpartum follow-up 06/22/2017    Postpartum care and examination    Encounter for supervision of normal pregnancy, unspecified, first trimester 03/07/2017    Prenatal care in first trimester    Encounter for supervision of normal pregnancy, unspecified, third trimester 05/11/2017    Prenatal " care in third trimester    Heart murmur     Maternal care for (suspected) fetal abnormality and damage, unspecified, not applicable or unspecified 01/24/2017    Known or suspected fetal anomaly, antepartum    Migraine, unspecified, not intractable, without status migrainosus 07/22/2016    Headache, migraine    Moderate cervical dysplasia 05/08/2014    CHRISTY II (cervical intraepithelial neoplasia II)    Obesity complicating pregnancy, unspecified trimester 05/11/2017    Obesity in pregnancy    Other chronic diseases of tonsils and adenoids 09/13/2016    Tonsil stone    Other specified disorders of amniotic fluid and membranes, first trimester, not applicable or unspecified 11/01/2016    Subchorionic hemorrhage in first trimester    Personal history of other diseases of the digestive system 03/08/2016    History of constipation    Personal history of other diseases of the digestive system 03/08/2016    History of constipation    Personal history of other diseases of the female genital tract 04/27/2018    History of amenorrhea    Personal history of other endocrine, nutritional and metabolic disease 07/07/2017    History of vitamin D deficiency    Personal history of other specified conditions 03/18/2016    History of nausea    Personal history of other specified conditions 03/18/2016    History of abdominal pain    Personal history of urinary (tract) infections 04/27/2016    History of recurrent urinary tract infection    Personal history of urinary (tract) infections 04/27/2016    History of urinary tract infection    Prediabetes 04/22/2016    Prediabetes    Unspecified diabetes mellitus in pregnancy, unspecified trimester 11/01/2016    Pregnancy diabetes        Past Surgical History:   Procedure Laterality Date    CERVICAL BIOPSY  W/ LOOP ELECTRODE EXCISION      Cervical Loop Electrosurgical Excision (LEEP)    CYST REMOVAL  10/04/2023    Dermal cystic    KNEE ARTHROSCOPY W/ MENISCAL REPAIR  10/2018    WISDOM TOOTH  EXTRACTION          Social Connections: Not on file          Physical Exam:  General appearance: WN, WD female, in no acute distress  Skin: No rashes, lesions or wounds  Head: Normocephalic, no evidence of trauma  Eye: EOMI, conjunctiva clear, no discharge  ENT: Nares patent  Neck: No abnormal contour, tracheal midline  Chest/lungs: No respiratory distress, speaking in complete sentences  Musculoskeletal: Tender to palpation over the proximal third, fourth metatarsals and lateral midfoot.  Nontender base of the fifth metatarsal.  Nontender calcaneus.  Nontender medial and lateral malleoli.  She has full and symmetric ankle range of motion bilaterally.  4/5 manual muscle testing ankle dorsiflexion, plantarflexion, inversion, eversion right versus left secondary to pain.  2+ dorsalis pedis and posterior tibialis pulses bilaterally.  No decreased ROM, muscle wasting, rigidity    Neurological: A&O x3, no focal deficits, intact bilateral LE  Psych: normal affect, mood, appearance      Image Results:  X-rays taken on 4/9/2024 were reviewed with the patient in the office today and reveal a possible proximal third and fourth metatarsal fracture.      Assessment/Plan   Encounter Diagnoses:  Closed nondisplaced fracture of fourth metatarsal bone of left foot, initial encounter    Sprain of foot, left, initial encounter    Left foot pain    Orders Placed This Encounter    Walking Boot Short    Crutches    XR foot left 3+ views       The patient and I discussed her clinical presentation and physical exam findings consistent with a fourth metatarsal fracture, third metatarsal fracture nondisplaced versus a midfoot sprain.  We agreed upon conservative management at this time.  Since she is having an increase amount of pain and discomfort with ambulation we agreed upon a short cam walker boot and crutches for weightbearing as tolerated.  Will wear this boot at all times while ambulating.  It is okay for her to take this off to  sleep.  I encouraged her to place it back onto her foot if she were to need to use the bathroom overnight.  She will begin to take 800 mg of ibuprofen 3 times a day with food and to extra strength 500 mg Tylenol up to 3 times a day as needed for pain.  She will obtain a compression sleeve stockinette to be worn at all times while she is awake.  She may take this off to shower and sleep.  She will elevate her extremity is much as she can.  She will place ice over the foot for 15-20 minutes at a time 2-3 times per day.  She will perform gentle toe flexion extension exercises, ankle range of motion.  She will follow-up on 4/23/2024 with Dr. Magana.  All of her questions have been answered.  She is in agreement this plan.    Patient was prescribed a short cam walker boot and crutches for left foot third and fourth nondisplaced metatarsal fractures. The patient is ambulatory with or without aid; but, has weakness, instability and/or deformity of their left foot which requires stabilization from this orthosis to improve their function.      Verbal and written instructions for the use, wear schedule, cleaning and application of this item were given.  Patient was instructed that should the brace result in increased pain, decreased sensation, increased swelling, or an overall worsening of their medical condition, to please contact our office immediately.     Orthotic management and training was provided for skin care, modifications due to healing tissues, edema changes, interruption in skin integrity, and safety precautions with the orthosis.    ** This office note was dictated using Dragon voice to text software and was not proofread for spelling or grammatical errors **

## 2024-04-23 ENCOUNTER — OFFICE VISIT (OUTPATIENT)
Dept: ORTHOPEDIC SURGERY | Facility: HOSPITAL | Age: 39
End: 2024-04-23
Payer: COMMERCIAL

## 2024-04-23 ENCOUNTER — HOSPITAL ENCOUNTER (OUTPATIENT)
Dept: RADIOLOGY | Facility: HOSPITAL | Age: 39
Discharge: HOME | End: 2024-04-23
Payer: COMMERCIAL

## 2024-04-23 DIAGNOSIS — M79.672 LEFT FOOT PAIN: ICD-10-CM

## 2024-04-23 DIAGNOSIS — S93.602A FOOT SPRAIN, LEFT, INITIAL ENCOUNTER: Primary | ICD-10-CM

## 2024-04-23 PROCEDURE — 99213 OFFICE O/P EST LOW 20 MIN: CPT | Performed by: STUDENT IN AN ORGANIZED HEALTH CARE EDUCATION/TRAINING PROGRAM

## 2024-04-23 PROCEDURE — 73630 X-RAY EXAM OF FOOT: CPT | Mod: LT

## 2024-04-23 PROCEDURE — 99203 OFFICE O/P NEW LOW 30 MIN: CPT | Performed by: STUDENT IN AN ORGANIZED HEALTH CARE EDUCATION/TRAINING PROGRAM

## 2024-04-23 PROCEDURE — 4010F ACE/ARB THERAPY RXD/TAKEN: CPT | Performed by: STUDENT IN AN ORGANIZED HEALTH CARE EDUCATION/TRAINING PROGRAM

## 2024-04-23 PROCEDURE — 3008F BODY MASS INDEX DOCD: CPT | Performed by: STUDENT IN AN ORGANIZED HEALTH CARE EDUCATION/TRAINING PROGRAM

## 2024-04-23 PROCEDURE — 73630 X-RAY EXAM OF FOOT: CPT | Mod: LEFT SIDE | Performed by: INTERNAL MEDICINE

## 2024-04-23 NOTE — PROGRESS NOTES
REFERRAL SOURCE: No ref. provider found     CHIEF COMPLAINT: left foot pain    HISTORY OF PRESENT ILLNESS  Mike Chase is a very pleasant 39 y.o. female with history of HLD, diabetes who is here for evaluation of left foot pain.     4/23/24: On 4/8/2023 she was carrying a lawn chair and tripped and had an inversion injury and felt a pop in the lateral ankle.  She presented the next day to orthopedic injury clinic and was diagnosed with a nondisplaced fourth and third metatarsal fracture and placed in a boot and given crutches.  Currently, the pain is over the lateral aspect of her midfoot near the cuboid.  She said that she noted acute swelling which has improved.  She continues to have pain with weightbearing.  She has been weightbearing using the boot, but notes significant soreness by the end of the day.  Pain is achy and throbbing at the end of the day and can be sharp with inversion movements.  Denies pain in the ankle.    MEDS    Current Outpatient Medications:     albuterol (Ventolin HFA) 90 mcg/actuation inhaler, Inhale 2 puffs every 4 hours if needed for wheezing or shortness of breath., Disp: 18 g, Rfl: 0    ALPRAZolam (Xanax) 0.5 mg tablet, Take 1 tablet an hour before dental procedure, Disp: 2 tablet, Rfl: 0    atorvastatin (Lipitor) 20 mg tablet, Take 1 tablet (20 mg) by mouth once daily., Disp: 90 tablet, Rfl: 1    benzonatate (Tessalon) 200 mg capsule, Take 1 capsule (200 mg) by mouth 3 times a day as needed for cough. Do not crush or chew., Disp: 42 capsule, Rfl: 0    blood sugar diagnostic (Accu-Chek Guide test strips) strip, Use to check blood sugar 4 times daily, Disp: 200 strip, Rfl: 3    etonogestrel-eluting contraceptive (Nexplanon) 68 mg implant implant, Placed 3/30/2021, Disp: , Rfl:     glucagon 1 mg injection, Inject 1 mg into the shoulder, thigh, or buttocks 1 time if needed for low blood sugar - see comments (blood sugar under 70) for up to 4 doses. USE AS DIRECTED., Disp: 2 each, Rfl:  "1    hydrOXYzine HCL (Atarax) 25 mg tablet, Take 1 tablet (25 mg) by mouth every 8 hours if needed for anxiety., Disp: 60 tablet, Rfl: 0    ibuprofen 800 mg tablet, Take 1 tablet (800 mg) by mouth every 8 hours if needed for moderate pain (4 - 6)., Disp: 90 tablet, Rfl: 0    insulin glargine (Basaglar KwikPen U-100 Insulin) 100 unit/mL (3 mL) pen, Inject 35 Units under the skin once daily at bedtime., Disp: 36 mL, Rfl: 1    losartan (Cozaar) 25 mg tablet, Take 1 tablet (25 mg) by mouth once daily., Disp: 90 tablet, Rfl: 1    metFORMIN (Glucophage) 500 mg tablet, Take 1 tablet (500 mg) by mouth 2 times a day. Take with food., Disp: 180 tablet, Rfl: 3    pen needle, diabetic (BD Ultra-Fine Short Pen Needle) 31 gauge x 5/16\" needle, Inject 1 each under the skin once daily. 31G x 8MM; use once daily, Disp: 90 each, Rfl: 3    topiramate (Topamax) 50 mg tablet, Take 1 tablet (50 mg) by mouth 2 times a day., Disp: 180 tablet, Rfl: 1    ubrogepant (Ubrelvy) 100 mg tablet tablet, Take 1 tablet (100 mg) by mouth 1 time if needed (migraine. May repeat in 2 hours if symptoms persist or return. Max dose 200 mg/24 hr) for up to 16 doses., Disp: 16 tablet, Rfl: 0    ALLERGIES  Allergies   Allergen Reactions    Lisinopril Angioedema     Lip swelling       PAST MEDICAL HISTORY  Past Medical History:   Diagnosis Date    Allergic     Allergic rhinitis due to pollen 05/26/2016    Allergic rhinitis due to pollen    Arthritis     Contact with and (suspected) exposure to infections with a predominantly sexual mode of transmission 05/12/2014    Exposure to STD    Depression     Dysuria 05/13/2016    Dysuria    Encounter for routine postpartum follow-up ([facility]) 06/22/2017    Postpartum care and examination    Encounter for supervision of normal pregnancy, unspecified, first trimester ([facility]) 03/07/2017    Prenatal care in first trimester    Encounter for supervision of normal pregnancy, unspecified, third trimester ([facility]) 05/11/2017 "    Prenatal care in third trimester    Heart murmur     Maternal care for (suspected) fetal abnormality and damage, unspecified, not applicable or unspecified ([facility]) 01/24/2017    Known or suspected fetal anomaly, antepartum    Migraine, unspecified, not intractable, without status migrainosus 07/22/2016    Headache, migraine    Moderate cervical dysplasia 05/08/2014    CHRISTY II (cervical intraepithelial neoplasia II)    Obesity complicating pregnancy, unspecified trimester ([facility]) 05/11/2017    Obesity in pregnancy    Other chronic diseases of tonsils and adenoids 09/13/2016    Tonsil stone    Other specified disorders of amniotic fluid and membranes, first trimester, not applicable or unspecified ([facility]) 11/01/2016    Subchorionic hemorrhage in first trimester    Personal history of other diseases of the digestive system 03/08/2016    History of constipation    Personal history of other diseases of the digestive system 03/08/2016    History of constipation    Personal history of other diseases of the female genital tract 04/27/2018    History of amenorrhea    Personal history of other endocrine, nutritional and metabolic disease 07/07/2017    History of vitamin D deficiency    Personal history of other specified conditions 03/18/2016    History of nausea    Personal history of other specified conditions 03/18/2016    History of abdominal pain    Personal history of urinary (tract) infections 04/27/2016    History of recurrent urinary tract infection    Personal history of urinary (tract) infections 04/27/2016    History of urinary tract infection    Prediabetes 04/22/2016    Prediabetes    Unspecified diabetes mellitus in pregnancy, unspecified trimester ([facility]) 11/01/2016    Pregnancy diabetes       PAST SURGICAL HISTORY  Past Surgical History:   Procedure Laterality Date    CERVICAL BIOPSY  W/ LOOP ELECTRODE EXCISION      Cervical Loop Electrosurgical Excision (LEEP)    CYST REMOVAL  10/04/2023    Dermal  cystic    KNEE ARTHROSCOPY W/ MENISCAL REPAIR  10/2018    WISDOM TOOTH EXTRACTION         SOCIAL HISTORY   Social History     Socioeconomic History    Marital status: Single     Spouse name: Not on file    Number of children: Not on file    Years of education: Not on file    Highest education level: Not on file   Occupational History    Not on file   Tobacco Use    Smoking status: Never    Smokeless tobacco: Never   Vaping Use    Vaping status: Never Used   Substance and Sexual Activity    Alcohol use: Yes     Alcohol/week: 1.0 standard drink of alcohol     Types: 1 Standard drinks or equivalent per week     Comment: occasionally    Drug use: Never    Sexual activity: Yes     Partners: Male     Birth control/protection: Implant   Other Topics Concern    Not on file   Social History Narrative    Not on file     Social Determinants of Health     Financial Resource Strain: Not on file   Food Insecurity: No Food Insecurity (2/27/2024)    Hunger Vital Sign     Worried About Running Out of Food in the Last Year: Never true     Ran Out of Food in the Last Year: Never true   Transportation Needs: Not on file   Physical Activity: Not on file   Stress: Stress Concern Present (2/27/2024)    [employer] - Occupational Stress Questionnaire     Feeling of Stress : To some extent   Social Connections: Not on file   Intimate Partner Violence: Not At Risk (2/27/2024)    Humiliation, Afraid, Rape, and Kick questionnaire     Fear of Current or Ex-Partner: No     Emotionally Abused: No     Physically Abused: No     Sexually Abused: No   Housing Stability: Not on file       FAMILY HISTORY  Family History   Problem Relation Name Age of Onset    Breast cancer Mother Mom     Diabetes Mother Mom     Early natural death Mother Mom     Hypertension Mother Mom     Breast cancer Mother's Sister Cordelia     Diabetes Mother's Sister Cordelia     Diabetes Maternal Grandmother Mick     Asthma Son Rudi     Asthma  Daughter Maranda     Depression Daughter Maranda     Mental illness Brother Brother        REVIEW OF SYSTEMS  Except for those mentioned in the history of present illness, and below, a complete review of systems is negative.     Review of Systems     VITALS  There were no vitals filed for this visit.    PHYSICAL EXAMINATION   GENERAL:  Awake, alert, and oriented, no apparent distress, pleasant, and cooperative  PSYC: Mood is euthymic, affect is congruent  EAR, NOSE, THROAT:  Normocephalic, atraumatic, moist membranes, anicteric sclera  LUNG: Nonlabored breathing  HEART: No clubbing or cyanosis  SKIN: No increased erythema, warmth, rashes, or concerning skin lesions  NEURO: Sensation is intact in the bilateral lower extremities. Strength is grossly 5 out of 5 throughout the bilateral lower extremities, unless noted below.  GAIT: Antalgic  MUSCULOSKELETAL: Examination of the left foot: Ankle range of motion is limited with dorsiflexion, inversion, and eversion and painful over the lateral aspect of the foot.  Tenderness palpation over the location of the cuboid.  No tenderness palpation over the third metatarsal.  There is some tenderness over the fourth and fifth metatarsals diffusely.  No tenderness to palpation over the peroneal tendons.  Strength of the foot is intact.    IMAGING STUDIES: Radiographs of the left foot dated 4/23/2024 were personally reviewed and interpreted by me, Dr. Marhsa Magana, and the findings shared with the patient.  Images were also discussed with the on-call radiologist.  There is questionable lucency at the base of the third metatarsal concerning for subacute fracture.     IMPRESSION  #1  Acute left lateral foot pain that started on 4/8/2024 concerning for midfoot sprain vs bifurcate ligament injury    PLAN  The following was discussed with the patient:     Mike Chase is a very pleasant 39 y.o. female with history of HLD, diabetes who is here for evaluation of left foot pain.  On  radiographs, there is questionable lucency at the base of third metatarsal concerning for subacute fracture.  However, she does not have any tenderness to palpation in this location.  Most of her pain and swelling is more lateral at the area of the cuboid with some extension into the fourth and fifth metatarsals, though that is more diffuse.  It is possible that she has midfoot sprain or bifurcate ligament injury.  There is no tenderness palpation over the ATFL or more proximally along the ankle joint.  Given her significant pain and swelling, we discussed that she should continue with the walking boot and crutches for an additional 2 weeks and I will follow-up at that time.  She does not need repeat x-rays at that time.    The patient was counseled to remain active, but avoid activities that worsen symptoms. The patient was in agreement with this plan. All questions were answered to the best of my ability.    PATIENT EDUCATION:  Education was discussed at today's appointment. A learning needs assessment was performed.    Primary learner: Mike Chase  Barriers to learning: None  Preferred language: English  Learning preferences include: Seeing and doing.  Discussed: Diagnosis and treatment plan.  Demonstrated: Understanding of material discussed.  Patient education materials given: None.  Learner response: Learner demonstrated understanding.    This note was dictated using Dragon speech recognition software and was not corrected for spelling or grammatical errors.

## 2024-05-02 DIAGNOSIS — H10.45 CHRONIC ALLERGIC CONJUNCTIVITIS: Primary | ICD-10-CM

## 2024-05-02 DIAGNOSIS — J30.9 ALLERGIC RHINITIS, UNSPECIFIED SEASONALITY, UNSPECIFIED TRIGGER: ICD-10-CM

## 2024-05-02 RX ORDER — CETIRIZINE HYDROCHLORIDE 10 MG/1
10 TABLET ORAL DAILY
Qty: 90 TABLET | Refills: 0 | Status: SHIPPED | OUTPATIENT
Start: 2024-05-02 | End: 2024-07-31

## 2024-05-02 RX ORDER — KETOTIFEN FUMARATE 0.35 MG/ML
1 SOLUTION/ DROPS OPHTHALMIC 2 TIMES DAILY
Qty: 10 ML | Refills: 0 | Status: SHIPPED | OUTPATIENT
Start: 2024-05-02

## 2024-05-02 RX ORDER — FLUTICASONE PROPIONATE 50 MCG
1 SPRAY, SUSPENSION (ML) NASAL DAILY
Qty: 16 G | Refills: 2 | Status: SHIPPED | OUTPATIENT
Start: 2024-05-02 | End: 2025-05-02

## 2024-05-03 ENCOUNTER — ALLIED HEALTH (OUTPATIENT)
Dept: INTEGRATIVE MEDICINE | Facility: CLINIC | Age: 39
End: 2024-05-03
Payer: COMMERCIAL

## 2024-05-03 DIAGNOSIS — M99.05 SEGMENTAL AND SOMATIC DYSFUNCTION OF PELVIC REGION: ICD-10-CM

## 2024-05-03 DIAGNOSIS — G89.29 CHRONIC BILATERAL LOW BACK PAIN WITHOUT SCIATICA: ICD-10-CM

## 2024-05-03 DIAGNOSIS — M99.03 SEGMENTAL AND SOMATIC DYSFUNCTION OF LUMBAR REGION: ICD-10-CM

## 2024-05-03 DIAGNOSIS — M54.2 CERVICALGIA: ICD-10-CM

## 2024-05-03 DIAGNOSIS — M99.02 SEGMENTAL AND SOMATIC DYSFUNCTION OF THORACIC REGION: Primary | ICD-10-CM

## 2024-05-03 DIAGNOSIS — M54.50 CHRONIC BILATERAL LOW BACK PAIN WITHOUT SCIATICA: ICD-10-CM

## 2024-05-03 PROCEDURE — 98941 CHIROPRACT MANJ 3-4 REGIONS: CPT | Performed by: CHIROPRACTOR

## 2024-05-03 NOTE — PROGRESS NOTES
Subjective   Patient ID: Mike Chase is a 39 y.o. female who presents May 3, 2024 for back and neck pain.    1/15 [facility]    Today, the patient rates their degree of pain as a 5 out of 10 on the numeric pain rating scale.     HPI : Mike returns to my office for chiropractic care following our last visit in April 2023. She presents today with main complaint of upper back, neck and low back tension. She reports tightness and discomfort in the upper trapezius regions, shoulder blade and neck. Patient also reports that she sprained her left ankle on 03/08/2024 and has been in a walking boot for the past month. She is using crutches to ambulate with the boot. She does report some continued discomfort when stretching her foot and over the lateral aspect of the ankle. No other change to health noted.      Objective   Physical Exam  Neurological:      General: No focal deficit present.      Mental Status: She is alert and oriented to person, place, and time.      Cranial Nerves: No dysarthria or facial asymmetry.      Sensory: Sensation is intact.      Motor: Motor function is intact.      Coordination: Coordination is intact.      Gait: Gait is intact. Gait normal.         Palpation of the following region(s) revealed:  Cervical: Upper trapezius bilateral, muscular hypertonicity.  Cervical paraspinals bilateral, muscular hypertonicity.  Thoracic: Rhomboids right, muscular hypertonicity.  Pectoralis bilateral, muscular hypertonicity.  Lumbar: Lumbar paraspinals left, muscular hypertonicity.  Quadratus lumborum right, muscular hypertonicity.  Gluteal bilateral, muscular hypertonicity.  Piriformis bilateral, muscular hypertonicity.        Segmental Joint(s): Segmental joint dysfunction was assessed with motion palpation and is identified in the following areas:  Thoracic : T3, T4, T7, and T8  Lumbopelvic / Sacral SIJ : L4, L5/S1, R SIJ, and L SIJ      Assessment/Plan   Today's Treatment Included: Chiropractic manipulation to  the  Segmental Joint(s) Lumbopelvic/Sacral SIJ : L4, L5/S1, R SIJ, and L SIJ Segmental Joint(s) Thoracic : T3, T4, T6, and T7   Treatment Techniques Used : Diversified CMT and Pelvic drop table technique    Cupping to rhomboids and traps    Soft-tissue mobilization was performed in the following areas:   Upper Trapezius bilateral  Middle Trapezius bilateral, Rhomboids bilateral, and Pectoralis bilateral  Gluteal mm. Glute. Med. bilateral and Piriformis bilateral            - Discussed addition of IDN on next visit -    Recommended to begin supportive care treatment: 2x/month for 2 months    The patient tolerated today's treatment with little or no additional discomfort and was instructed to contact the office for questions or concerns.

## 2024-05-07 ENCOUNTER — OFFICE VISIT (OUTPATIENT)
Dept: ORTHOPEDIC SURGERY | Facility: HOSPITAL | Age: 39
End: 2024-05-07
Payer: COMMERCIAL

## 2024-05-07 DIAGNOSIS — S92.025G CLOSED NONDISPLACED FRACTURE OF ANTERIOR PROCESS OF LEFT CALCANEUS WITH DELAYED HEALING, SUBSEQUENT ENCOUNTER: Primary | ICD-10-CM

## 2024-05-07 PROCEDURE — 99213 OFFICE O/P EST LOW 20 MIN: CPT | Performed by: STUDENT IN AN ORGANIZED HEALTH CARE EDUCATION/TRAINING PROGRAM

## 2024-05-07 PROCEDURE — 3008F BODY MASS INDEX DOCD: CPT | Performed by: STUDENT IN AN ORGANIZED HEALTH CARE EDUCATION/TRAINING PROGRAM

## 2024-05-07 PROCEDURE — 99213 OFFICE O/P EST LOW 20 MIN: CPT | Mod: GC | Performed by: STUDENT IN AN ORGANIZED HEALTH CARE EDUCATION/TRAINING PROGRAM

## 2024-05-07 PROCEDURE — 4010F ACE/ARB THERAPY RXD/TAKEN: CPT | Performed by: STUDENT IN AN ORGANIZED HEALTH CARE EDUCATION/TRAINING PROGRAM

## 2024-05-07 NOTE — PROGRESS NOTES
REFERRAL SOURCE: No ref. provider found     CHIEF COMPLAINT: left foot pain    HISTORY OF PRESENT ILLNESS  Mike Chase is a very pleasant 39 y.o. female with history of HLD, diabetes who is here for follow-up of left foot pain.     Previous history:  4/23/24: On 4/8/2023 she was carrying a lawn chair and tripped and had an inversion injury and felt a pop in the lateral ankle.  She presented the next day to orthopedic injury clinic and was diagnosed with a nondisplaced fourth and third metatarsal fracture and placed in a boot and given crutches.  Currently, the pain is over the lateral aspect of her midfoot near the cuboid.  She said that she noted acute swelling which has improved.  She continues to have pain with weightbearing.  She has been weightbearing using the boot, but notes significant soreness by the end of the day.  Pain is achy and throbbing at the end of the day and can be sharp with inversion movements.  Denies pain in the ankle.    Interval history:  5/7/24: She reports improved swelling but no improvements in pain. She is still in crutches and using a walking boot. She is taking Tylenol for pain.  Notes worsening pain towards the end of the day when she cannot keep her foot elevated consistently.    MEDS    Current Outpatient Medications:     albuterol (Ventolin HFA) 90 mcg/actuation inhaler, Inhale 2 puffs every 4 hours if needed for wheezing or shortness of breath., Disp: 18 g, Rfl: 0    ALPRAZolam (Xanax) 0.5 mg tablet, Take 1 tablet an hour before dental procedure, Disp: 2 tablet, Rfl: 0    atorvastatin (Lipitor) 20 mg tablet, Take 1 tablet (20 mg) by mouth once daily., Disp: 90 tablet, Rfl: 1    blood sugar diagnostic (Accu-Chek Guide test strips) strip, Use to check blood sugar 4 times daily, Disp: 200 strip, Rfl: 3    cetirizine (ZyrTEC) 10 mg tablet, Take 1 tablet (10 mg) by mouth once daily., Disp: 90 tablet, Rfl: 0    etonogestrel-eluting contraceptive (Nexplanon) 68 mg implant implant,  "Placed 3/30/2021, Disp: , Rfl:     fluticasone (Flonase) 50 mcg/actuation nasal spray, Administer 1 spray into each nostril once daily. Shake gently. Before first use, prime pump. After use, clean tip and replace cap., Disp: 16 g, Rfl: 2    glucagon 1 mg injection, Inject 1 mg into the shoulder, thigh, or buttocks 1 time if needed for low blood sugar - see comments (blood sugar under 70) for up to 4 doses. USE AS DIRECTED., Disp: 2 each, Rfl: 1    hydrOXYzine HCL (Atarax) 25 mg tablet, Take 1 tablet (25 mg) by mouth every 8 hours if needed for anxiety., Disp: 60 tablet, Rfl: 0    ibuprofen 800 mg tablet, Take 1 tablet (800 mg) by mouth every 8 hours if needed for moderate pain (4 - 6)., Disp: 90 tablet, Rfl: 0    insulin glargine (Basaglar KwikPen U-100 Insulin) 100 unit/mL (3 mL) pen, Inject 35 Units under the skin once daily at bedtime., Disp: 36 mL, Rfl: 1    ketotifen (Zaditor) 0.025 % (0.035 %) ophthalmic solution, Administer 1 drop into both eyes 2 times a day., Disp: 10 mL, Rfl: 0    losartan (Cozaar) 25 mg tablet, Take 1 tablet (25 mg) by mouth once daily., Disp: 90 tablet, Rfl: 1    metFORMIN (Glucophage) 500 mg tablet, Take 1 tablet (500 mg) by mouth 2 times a day. Take with food., Disp: 180 tablet, Rfl: 3    pen needle, diabetic (BD Ultra-Fine Short Pen Needle) 31 gauge x 5/16\" needle, Inject 1 each under the skin once daily. 31G x 8MM; use once daily, Disp: 90 each, Rfl: 3    topiramate (Topamax) 50 mg tablet, Take 1 tablet (50 mg) by mouth 2 times a day., Disp: 180 tablet, Rfl: 1    ubrogepant (Ubrelvy) 100 mg tablet tablet, Take 1 tablet (100 mg) by mouth 1 time if needed (migraine. May repeat in 2 hours if symptoms persist or return. Max dose 200 mg/24 hr) for up to 16 doses., Disp: 16 tablet, Rfl: 0    ALLERGIES  Allergies   Allergen Reactions    Lisinopril Angioedema     Lip swelling       PAST MEDICAL HISTORY  Past Medical History:   Diagnosis Date    Allergic     Allergic rhinitis due to pollen " 05/26/2016    Allergic rhinitis due to pollen    Arthritis     Contact with and (suspected) exposure to infections with a predominantly sexual mode of transmission 05/12/2014    Exposure to STD    Depression     Dysuria 05/13/2016    Dysuria    Encounter for routine postpartum follow-up ([facility]) 06/22/2017    Postpartum care and examination    Encounter for supervision of normal pregnancy, unspecified, first trimester ([facility]) 03/07/2017    Prenatal care in first trimester    Encounter for supervision of normal pregnancy, unspecified, third trimester ([facility]) 05/11/2017    Prenatal care in third trimester    Heart murmur     Maternal care for (suspected) fetal abnormality and damage, unspecified, not applicable or unspecified ([facility]) 01/24/2017    Known or suspected fetal anomaly, antepartum    Migraine, unspecified, not intractable, without status migrainosus 07/22/2016    Headache, migraine    Moderate cervical dysplasia 05/08/2014    CHRISTY II (cervical intraepithelial neoplasia II)    Obesity complicating pregnancy, unspecified trimester ([facility]) 05/11/2017    Obesity in pregnancy    Other chronic diseases of tonsils and adenoids 09/13/2016    Tonsil stone    Other specified disorders of amniotic fluid and membranes, first trimester, not applicable or unspecified ([facility]) 11/01/2016    Subchorionic hemorrhage in first trimester    Personal history of other diseases of the digestive system 03/08/2016    History of constipation    Personal history of other diseases of the digestive system 03/08/2016    History of constipation    Personal history of other diseases of the female genital tract 04/27/2018    History of amenorrhea    Personal history of other endocrine, nutritional and metabolic disease 07/07/2017    History of vitamin D deficiency    Personal history of other specified conditions 03/18/2016    History of nausea    Personal history of other specified conditions 03/18/2016    History of abdominal  pain    Personal history of urinary (tract) infections 04/27/2016    History of recurrent urinary tract infection    Personal history of urinary (tract) infections 04/27/2016    History of urinary tract infection    Prediabetes 04/22/2016    Prediabetes    Unspecified diabetes mellitus in pregnancy, unspecified trimester ([facility]) 11/01/2016    Pregnancy diabetes       PAST SURGICAL HISTORY  Past Surgical History:   Procedure Laterality Date    CERVICAL BIOPSY  W/ LOOP ELECTRODE EXCISION      Cervical Loop Electrosurgical Excision (LEEP)    CYST REMOVAL  10/04/2023    Dermal cystic    KNEE ARTHROSCOPY W/ MENISCAL REPAIR  10/2018    WISDOM TOOTH EXTRACTION         SOCIAL HISTORY   Social History     Socioeconomic History    Marital status: Single     Spouse name: Not on file    Number of children: Not on file    Years of education: Not on file    Highest education level: Not on file   Occupational History    Not on file   Tobacco Use    Smoking status: Never    Smokeless tobacco: Never   Vaping Use    Vaping status: Never Used   Substance and Sexual Activity    Alcohol use: Yes     Alcohol/week: 1.0 standard drink of alcohol     Types: 1 Standard drinks or equivalent per week     Comment: occasionally    Drug use: Never    Sexual activity: Yes     Partners: Male     Birth control/protection: Implant   Other Topics Concern    Not on file   Social History Narrative    Not on file     Social Determinants of Health     Financial Resource Strain: Not on file   Food Insecurity: No Food Insecurity (2/27/2024)    Hunger Vital Sign     Worried About Running Out of Food in the Last Year: Never true     Ran Out of Food in the Last Year: Never true   Transportation Needs: Not on file   Physical Activity: Not on file   Stress: Stress Concern Present (2/27/2024)    [employer] [place] [employer] - Occupational Stress Questionnaire     Feeling of Stress : To some extent   Social Connections: Not on file   Intimate Partner  Violence: Not At Risk (2/27/2024)    Humiliation, Afraid, Rape, and Kick questionnaire     Fear of Current or Ex-Partner: No     Emotionally Abused: No     Physically Abused: No     Sexually Abused: No   Housing Stability: Not on file       FAMILY HISTORY  Family History   Problem Relation Name Age of Onset    Breast cancer Mother Mom     Diabetes Mother Mom     Early natural death Mother Mom     Hypertension Mother Mom     Breast cancer Mother's Sister Cordelia     Diabetes Mother's Sister Cordelia     Diabetes Maternal Grandmother Mick     Asthma Son Rudi     Asthma Daughter Maranda     Depression Daughter Maranda     Mental illness Brother Brother        REVIEW OF SYSTEMS  Except for those mentioned in the history of present illness, and below, a complete review of systems is negative.     Review of Systems     VITALS  There were no vitals filed for this visit.    PHYSICAL EXAMINATION   GENERAL:  Awake, alert, and oriented, no apparent distress, pleasant, and cooperative  PSYC: Mood is euthymic, affect is congruent  EAR, NOSE, THROAT:  Normocephalic, atraumatic, moist membranes, anicteric sclera  LUNG: Nonlabored breathing  HEART: No clubbing or cyanosis  SKIN: No increased erythema, warmth, rashes, or concerning skin lesions  NEURO: Sensation is intact in the bilateral lower extremities. Strength is grossly 5 out of 5 throughout the bilateral lower extremities, unless noted below.  GAIT: Antalgic with difficulty bearing weight.  MUSCULOSKELETAL: Examination of the left foot: Ankle range of motion is limited with dorsiflexion, inversion, and eversion and painful over the lateral aspect of the foot.  Tenderness palpation over the location of the cuboid, anterior process of the calcaneus.  Minimal tenderness to palpation over the ATFL.  No tenderness palpation over the base of the third metatarsal.  No tenderness to palpation over the peroneal tendons.  Strength of the foot is intact but painful.    IMAGING STUDIES:    Radiographs of the left foot dated 4/23/2024 - questionable lucency at the base of the third metatarsal concerning for subacute fracture.     IMPRESSION  #1  Acute left lateral foot pain that started on 4/8/2024 concerning for midfoot sprain vs bifurcate ligament injury    PLAN  The following was discussed with the patient:     Mike Chase is a very pleasant 39 y.o. female with history of HLD, diabetes who is here for evaluation of left foot pain.  On radiographs, there is questionable lucency at the base of third metatarsal concerning for subacute fracture.  However, she does not have any tenderness to palpation in this location.  Most of her pain and swelling is more lateral at the area of the cuboid and anterior process of the calcaneus.  She has mild tenderness over the ATFL.  Given the significant swelling upon presentation and tenderness palpation of the anterior process of calcaneus with my inability to see that on radiographs, I would like to obtain an MRI for evaluation.  This will also evaluate for ligamentous injury in the ankle and proximal midfoot.  Given her significant pain, she should continue with a walking boot.  She does not have pain with ambulation in the walking boot, she can begin to wean off of the crutches.  She was instructed on range of motion exercises of the ankle that she should begin to prevent stiffness.  Follow-up after the MRI.    The patient was counseled to remain active, but avoid activities that worsen symptoms. The patient was in agreement with this plan. All questions were answered to the best of my ability.    PATIENT EDUCATION:  Education was discussed at today's appointment. A learning needs assessment was performed.    Primary learner: Mike Chase  Barriers to learning: None  Preferred language: English  Learning preferences include: Seeing and doing.  Discussed: Diagnosis and treatment plan.  Demonstrated: Understanding of material discussed.  Patient education  materials given: None.  Learner response: Learner demonstrated understanding.    This note was dictated using Dragon speech recognition software and was not corrected for spelling or grammatical errors.    Patient seen and examined with PM&R resident, Dr. Rader. History, physical examination, pertinent imaging findings and the plan of care were discussed and I performed the key portions of the history, physical examination, and discussion of the plan of care.      Marsha Magana MD    [facility]

## 2024-05-08 ENCOUNTER — ALLIED HEALTH (OUTPATIENT)
Dept: INTEGRATIVE MEDICINE | Facility: CLINIC | Age: 39
End: 2024-05-08
Payer: COMMERCIAL

## 2024-05-08 DIAGNOSIS — M54.2 CERVICALGIA: ICD-10-CM

## 2024-05-08 DIAGNOSIS — G89.29 CHRONIC BILATERAL LOW BACK PAIN WITHOUT SCIATICA: ICD-10-CM

## 2024-05-08 DIAGNOSIS — M54.50 CHRONIC BILATERAL LOW BACK PAIN WITHOUT SCIATICA: ICD-10-CM

## 2024-05-08 DIAGNOSIS — M99.02 SEGMENTAL AND SOMATIC DYSFUNCTION OF THORACIC REGION: Primary | ICD-10-CM

## 2024-05-08 DIAGNOSIS — M99.05 SEGMENTAL AND SOMATIC DYSFUNCTION OF PELVIC REGION: ICD-10-CM

## 2024-05-08 DIAGNOSIS — M99.03 SEGMENTAL AND SOMATIC DYSFUNCTION OF LUMBAR REGION: ICD-10-CM

## 2024-05-08 PROCEDURE — 98941 CHIROPRACT MANJ 3-4 REGIONS: CPT | Performed by: CHIROPRACTOR

## 2024-05-08 NOTE — PROGRESS NOTES
Subjective   Patient ID: Mike Chase is a 39 y.o. female who presents May 8, 2024 for back and neck pain.    2/15 [facility]    Today, the patient rates their degree of pain as a 5 out of 10 on the numeric pain rating scale.     HPI : Mike returns to my office for chiropractic care with main complaint of upper back and neck tension. She does report mild lower back discomfort. She remains in a walking boot following left ankle injury on 03/08/2024. Reports she is pending an ankle MRI. Notes she has been trying to wear a shoe with a thicker heel to keep her gait at neutral. No other change to health noted.      Objective   Physical Exam  Neurological:      General: No focal deficit present.      Mental Status: She is alert and oriented to person, place, and time.      Cranial Nerves: No dysarthria or facial asymmetry.      Sensory: Sensation is intact.      Motor: Motor function is intact.      Coordination: Coordination is intact.      Gait: Gait is intact. Gait normal.         Palpation of the following region(s) revealed:  Cervical: Upper trapezius bilateral, muscular hypertonicity.  Cervical paraspinals bilateral, muscular hypertonicity.  Thoracic: Rhomboids right, muscular hypertonicity.  Pectoralis bilateral, muscular hypertonicity.  Lumbar: Lumbar paraspinals left, muscular hypertonicity.  Quadratus lumborum right, muscular hypertonicity.  Gluteal bilateral, muscular hypertonicity.  Piriformis bilateral, muscular hypertonicity.        Segmental Joint(s): Segmental joint dysfunction was assessed with motion palpation and is identified in the following areas:  Thoracic : T3, T4, T7, and T8  Lumbopelvic / Sacral SIJ : L4, L5/S1, R SIJ, and L SIJ      Assessment/Plan   Today's Treatment Included: Chiropractic manipulation to the  Segmental Joint(s) Lumbopelvic/Sacral SIJ : L4, L5/S1, R SIJ, and L SIJ Segmental Joint(s) Thoracic : T3, T4, T7, and T8   Treatment Techniques Used : Diversified CMT, Activator/Tool assisted  technique, and Pelvic drop table technique  Integrative Dry Needling (IDN) - Needles in / out:  3.  IDN: BL upper trap, L glute med    Soft-tissue mobilization was performed in the following areas:   Upper Trapezius bilateral  Middle Trapezius bilateral, Rhomboids bilateral, and Pectoralis bilateral  Gluteal mm. Glute. Med. bilateral and Piriformis bilateral      Recommended to begin supportive care treatment: 2x/month for 2 months    The patient tolerated today's treatment with little or no additional discomfort and was instructed to contact the office for questions or concerns.

## 2024-05-21 ENCOUNTER — OFFICE VISIT (OUTPATIENT)
Dept: OBSTETRICS AND GYNECOLOGY | Facility: HOSPITAL | Age: 39
End: 2024-05-21
Payer: COMMERCIAL

## 2024-05-21 VITALS
DIASTOLIC BLOOD PRESSURE: 80 MMHG | RESPIRATION RATE: 16 BRPM | SYSTOLIC BLOOD PRESSURE: 128 MMHG | WEIGHT: 293 LBS | BODY MASS INDEX: 58.88 KG/M2

## 2024-05-21 DIAGNOSIS — Z30.46 ENCOUNTER FOR REMOVAL AND REINSERTION OF ETONOGESTREL IMPLANT: Primary | ICD-10-CM

## 2024-05-21 PROCEDURE — 3079F DIAST BP 80-89 MM HG: CPT | Performed by: STUDENT IN AN ORGANIZED HEALTH CARE EDUCATION/TRAINING PROGRAM

## 2024-05-21 PROCEDURE — 11983 REMOVE/INSERT DRUG IMPLANT: CPT | Performed by: STUDENT IN AN ORGANIZED HEALTH CARE EDUCATION/TRAINING PROGRAM

## 2024-05-21 PROCEDURE — 4010F ACE/ARB THERAPY RXD/TAKEN: CPT | Performed by: STUDENT IN AN ORGANIZED HEALTH CARE EDUCATION/TRAINING PROGRAM

## 2024-05-21 PROCEDURE — 1036F TOBACCO NON-USER: CPT | Performed by: STUDENT IN AN ORGANIZED HEALTH CARE EDUCATION/TRAINING PROGRAM

## 2024-05-21 PROCEDURE — 2500000004 HC RX 250 GENERAL PHARMACY W/ HCPCS (ALT 636 FOR OP/ED): Performed by: STUDENT IN AN ORGANIZED HEALTH CARE EDUCATION/TRAINING PROGRAM

## 2024-05-21 PROCEDURE — 3008F BODY MASS INDEX DOCD: CPT | Performed by: STUDENT IN AN ORGANIZED HEALTH CARE EDUCATION/TRAINING PROGRAM

## 2024-05-21 PROCEDURE — 3074F SYST BP LT 130 MM HG: CPT | Performed by: STUDENT IN AN ORGANIZED HEALTH CARE EDUCATION/TRAINING PROGRAM

## 2024-05-21 RX ORDER — KETOCONAZOLE 20 MG/ML
SHAMPOO, SUSPENSION TOPICAL
COMMUNITY
Start: 2024-05-17

## 2024-05-21 RX ORDER — HYDROCORTISONE 25 MG/G
OINTMENT TOPICAL
COMMUNITY
Start: 2024-05-16

## 2024-05-21 RX ORDER — TRIAMCINOLONE ACETONIDE 1 MG/G
OINTMENT TOPICAL
COMMUNITY
Start: 2024-05-16

## 2024-05-21 RX ADMIN — ETONOGESTREL 1 EACH: 68 IMPLANT SUBCUTANEOUS at 15:09

## 2024-05-21 ASSESSMENT — ENCOUNTER SYMPTOMS
CARDIOVASCULAR NEGATIVE: 0
ALLERGIC/IMMUNOLOGIC NEGATIVE: 0
MUSCULOSKELETAL NEGATIVE: 0
CONSTITUTIONAL NEGATIVE: 0
HEMATOLOGIC/LYMPHATIC NEGATIVE: 0
ENDOCRINE NEGATIVE: 0
EYES NEGATIVE: 0
GASTROINTESTINAL NEGATIVE: 0
RESPIRATORY NEGATIVE: 0
PSYCHIATRIC NEGATIVE: 0
NEUROLOGICAL NEGATIVE: 0

## 2024-05-21 ASSESSMENT — PAIN SCALES - GENERAL: PAINLEVEL: 2

## 2024-05-21 NOTE — PROGRESS NOTES
Patient ID: Mike Chase is a 39 y.o. presenting for Nexplanon removal and reinsertion.    Insertion of Contraceptive Capsule    Date/Time: 5/21/2024 9:55 AM    Performed by: Ness Castillo MD  Authorized by: Ness Castillo MD    Consent:     Consent obtained:  Verbal and written    Consent given by:  Patient    Procedural risks discussed:  Bleeding, damage to other organs, infection and possible continued pain    Patient questions answered: yes      Patient agrees, verbalizes understanding, and wants to proceed: yes      Educational handouts given: yes      Instructions and paperwork completed: yes    Universal Protocol:     Patient states understanding of procedure being performed: yes      Relevant documents present and verified: yes      Required blood products, implants, devices, and special equipment available: yes      Site marked: yes    Indication:     Indication: Presence of non-biodegradable drug delivery implant    Pre-procedure:     Prepped with: alcohol 70% and povidone-iodine      Local anesthetic:  Lidocaine with epinephrine    The site was cleaned and prepped in a sterile fashion: yes    Procedure:     Procedure:  Removal with reinsertion    Small stab incision was made in arm: yes      Left/right:  Left    Preloaded contraceptive capsule trocar was placed subdermally: yes      Visualization of implant was obtained: yes      Contraceptive capsule was inserted and trocar removed: yes      Visualization of notch in stylet and palpation of device: yes      Palpation confirms placement by provider and patient: yes      Site was closed with steri-strips and pressure bandage applied: yes    Comments:      Prior implant was located in the biceps groove therefore the prior device was removed in standard fashion and the new device inserted in the ventromedial surface of the arm away from the biceps groove per  current guidelines.      Uncomplicated procedure    Intends to follow-up for well  check.    Ness Castillo MD

## 2024-05-22 ENCOUNTER — HOSPITAL ENCOUNTER (OUTPATIENT)
Dept: RADIOLOGY | Facility: CLINIC | Age: 39
End: 2024-05-22
Payer: COMMERCIAL

## 2024-05-22 ENCOUNTER — HOSPITAL ENCOUNTER (OUTPATIENT)
Dept: RADIOLOGY | Facility: CLINIC | Age: 39
Discharge: HOME | End: 2024-05-22
Payer: COMMERCIAL

## 2024-05-22 DIAGNOSIS — S92.025G CLOSED NONDISPLACED FRACTURE OF ANTERIOR PROCESS OF LEFT CALCANEUS WITH DELAYED HEALING, SUBSEQUENT ENCOUNTER: ICD-10-CM

## 2024-05-22 PROCEDURE — 73721 MRI JNT OF LWR EXTRE W/O DYE: CPT | Mod: LEFT SIDE | Performed by: RADIOLOGY

## 2024-05-22 PROCEDURE — 73721 MRI JNT OF LWR EXTRE W/O DYE: CPT | Mod: LT

## 2024-05-23 ENCOUNTER — APPOINTMENT (OUTPATIENT)
Dept: RADIOLOGY | Facility: CLINIC | Age: 39
End: 2024-05-23
Payer: COMMERCIAL

## 2024-05-24 ENCOUNTER — TELEMEDICINE (OUTPATIENT)
Dept: ORTHOPEDIC SURGERY | Facility: CLINIC | Age: 39
End: 2024-05-24
Payer: COMMERCIAL

## 2024-05-24 DIAGNOSIS — S93.312A CUBOID SYNDROME OF LEFT FOOT: ICD-10-CM

## 2024-05-24 DIAGNOSIS — S92.026S: Primary | ICD-10-CM

## 2024-05-24 PROCEDURE — 3008F BODY MASS INDEX DOCD: CPT | Performed by: STUDENT IN AN ORGANIZED HEALTH CARE EDUCATION/TRAINING PROGRAM

## 2024-05-24 PROCEDURE — 99442 PR PHYS/QHP TELEPHONE EVALUATION 11-20 MIN: CPT | Performed by: STUDENT IN AN ORGANIZED HEALTH CARE EDUCATION/TRAINING PROGRAM

## 2024-05-24 PROCEDURE — 4010F ACE/ARB THERAPY RXD/TAKEN: CPT | Performed by: STUDENT IN AN ORGANIZED HEALTH CARE EDUCATION/TRAINING PROGRAM

## 2024-05-24 NOTE — PROGRESS NOTES
VIRTUAL VISIT: I performed this visit using real-time telehealth tools, including audio connection between Mike Chase in their home and myself, Dr. Magana, in [facility]. The patient provided consent to proceed with a virtual visit. I spent a total of 11 minutes with the patient and on the visit and more than 50% of this time was spent in counseling or coordination of care.    REFERRAL SOURCE: No ref. provider found     CHIEF COMPLAINT: left foot pain    HISTORY OF PRESENT ILLNESS  Mike Chase is a very pleasant 39 y.o. female with history of HLD, diabetes who is here for follow-up of left foot pain.     Previous history:  4/23/24: On 4/8/2023 she was carrying a lawn chair and tripped and had an inversion injury and felt a pop in the lateral ankle.  She presented the next day to orthopedic injury clinic and was diagnosed with a nondisplaced fourth and third metatarsal fracture and placed in a boot and given crutches.  Currently, the pain is over the lateral aspect of her midfoot near the cuboid.  She said that she noted acute swelling which has improved.  She continues to have pain with weightbearing.  She has been weightbearing using the boot, but notes significant soreness by the end of the day.  Pain is achy and throbbing at the end of the day and can be sharp with inversion movements.  Denies pain in the ankle.    5/7/24: She reports improved swelling but no improvements in pain. She is still in crutches and using a walking boot. She is taking Tylenol for pain.  Notes worsening pain towards the end of the day when she cannot keep her foot elevated consistently.    Interval history:  5/24/24: She has been wearing the boot and doesn't have pain in the foot while in boot. Swelling has improved.  She does report prior injury to the ankle, which her mother recalled, that happened 10-15 years ago and she thought she sprained her ankle.    MEDS    Current Outpatient Medications:      albuterol (Ventolin HFA) 90 mcg/actuation inhaler, Inhale 2 puffs every 4 hours if needed for wheezing or shortness of breath., Disp: 18 g, Rfl: 0    ALPRAZolam (Xanax) 0.5 mg tablet, Take 1 tablet an hour before dental procedure, Disp: 2 tablet, Rfl: 0    atorvastatin (Lipitor) 20 mg tablet, Take 1 tablet (20 mg) by mouth once daily., Disp: 90 tablet, Rfl: 1    blood sugar diagnostic (Accu-Chek Guide test strips) strip, Use to check blood sugar 4 times daily, Disp: 200 strip, Rfl: 3    cetirizine (ZyrTEC) 10 mg tablet, Take 1 tablet (10 mg) by mouth once daily., Disp: 90 tablet, Rfl: 0    fluticasone (Flonase) 50 mcg/actuation nasal spray, Administer 1 spray into each nostril once daily. Shake gently. Before first use, prime pump. After use, clean tip and replace cap., Disp: 16 g, Rfl: 2    glucagon 1 mg injection, Inject 1 mg into the shoulder, thigh, or buttocks 1 time if needed for low blood sugar - see comments (blood sugar under 70) for up to 4 doses. USE AS DIRECTED., Disp: 2 each, Rfl: 1    hydrocortisone 2.5 % ointment, , Disp: , Rfl:     hydrOXYzine HCL (Atarax) 25 mg tablet, Take 1 tablet (25 mg) by mouth every 8 hours if needed for anxiety., Disp: 60 tablet, Rfl: 0    ibuprofen 800 mg tablet, Take 1 tablet (800 mg) by mouth every 8 hours if needed for moderate pain (4 - 6)., Disp: 90 tablet, Rfl: 0    insulin glargine (Basaglar KwikPen U-100 Insulin) 100 unit/mL (3 mL) pen, Inject 35 Units under the skin once daily at bedtime., Disp: 36 mL, Rfl: 1    ketoconazole (NIZOral) 2 % shampoo, , Disp: , Rfl:     ketotifen (Zaditor) 0.025 % (0.035 %) ophthalmic solution, Administer 1 drop into both eyes 2 times a day., Disp: 10 mL, Rfl: 0    losartan (Cozaar) 25 mg tablet, Take 1 tablet (25 mg) by mouth once daily., Disp: 90 tablet, Rfl: 1    metFORMIN (Glucophage) 500 mg tablet, Take 1 tablet (500 mg) by mouth 2 times a day. Take with food., Disp: 180 tablet, Rfl: 3    pen needle, diabetic (BD Ultra-Fine Short  "Pen Needle) 31 gauge x 5/16\" needle, Inject 1 each under the skin once daily. 31G x 8MM; use once daily, Disp: 90 each, Rfl: 3    topiramate (Topamax) 50 mg tablet, Take 1 tablet (50 mg) by mouth 2 times a day., Disp: 180 tablet, Rfl: 1    triamcinolone (Kenalog) 0.1 % ointment, , Disp: , Rfl:     ubrogepant (Ubrelvy) 100 mg tablet tablet, Take 1 tablet (100 mg) by mouth 1 time if needed (migraine. May repeat in 2 hours if symptoms persist or return. Max dose 200 mg/24 hr) for up to 16 doses., Disp: 16 tablet, Rfl: 0    ALLERGIES  Allergies   Allergen Reactions    Lisinopril Angioedema     Lip swelling       PAST MEDICAL HISTORY  Past Medical History:   Diagnosis Date    Allergic     Allergic rhinitis due to pollen 05/26/2016    Allergic rhinitis due to pollen    Arthritis     Contact with and (suspected) exposure to infections with a predominantly sexual mode of transmission 05/12/2014    Exposure to STD    Depression     Dysuria 05/13/2016    Dysuria    Encounter for routine postpartum follow-up ([facility]) 06/22/2017    Postpartum care and examination    Encounter for supervision of normal pregnancy, unspecified, first trimester ([facility]) 03/07/2017    Prenatal care in first trimester    Encounter for supervision of normal pregnancy, unspecified, third trimester ([facility]) 05/11/2017    Prenatal care in third trimester    Heart murmur     Maternal care for (suspected) fetal abnormality and damage, unspecified, not applicable or unspecified ([facility]) 01/24/2017    Known or suspected fetal anomaly, antepartum    Migraine, unspecified, not intractable, without status migrainosus 07/22/2016    Headache, migraine    Moderate cervical dysplasia 05/08/2014    CHRISTY II (cervical intraepithelial neoplasia II)    Obesity complicating pregnancy, unspecified trimester ([facility]) 05/11/2017    Obesity in pregnancy    Other chronic diseases of tonsils and adenoids 09/13/2016    Tonsil stone    Other specified disorders of amniotic " fluid and membranes, first trimester, not applicable or unspecified ([facility]) 11/01/2016    Subchorionic hemorrhage in first trimester    Personal history of other diseases of the digestive system 03/08/2016    History of constipation    Personal history of other diseases of the digestive system 03/08/2016    History of constipation    Personal history of other diseases of the female genital tract 04/27/2018    History of amenorrhea    Personal history of other endocrine, nutritional and metabolic disease 07/07/2017    History of vitamin D deficiency    Personal history of other specified conditions 03/18/2016    History of nausea    Personal history of other specified conditions 03/18/2016    History of abdominal pain    Personal history of urinary (tract) infections 04/27/2016    History of recurrent urinary tract infection    Personal history of urinary (tract) infections 04/27/2016    History of urinary tract infection    Prediabetes 04/22/2016    Prediabetes    Unspecified diabetes mellitus in pregnancy, unspecified trimester ([facility]) 11/01/2016    Pregnancy diabetes       PAST SURGICAL HISTORY  Past Surgical History:   Procedure Laterality Date    CERVICAL BIOPSY  W/ LOOP ELECTRODE EXCISION      Cervical Loop Electrosurgical Excision (LEEP)    CYST REMOVAL  10/04/2023    Dermal cystic    KNEE ARTHROSCOPY W/ MENISCAL REPAIR  10/2018    WISDOM TOOTH EXTRACTION         SOCIAL HISTORY   Social History     Socioeconomic History    Marital status: Single     Spouse name: Not on file    Number of children: Not on file    Years of education: Not on file    Highest education level: Not on file   Occupational History    Not on file   Tobacco Use    Smoking status: Never    Smokeless tobacco: Never   Vaping Use    Vaping status: Never Used   Substance and Sexual Activity    Alcohol use: Yes     Alcohol/week: 1.0 standard drink of alcohol     Types: 1 Standard drinks or equivalent per week     Comment: occasionally     Drug use: Never    Sexual activity: Yes     Partners: Male     Birth control/protection: Implant   Other Topics Concern    Not on file   Social History Narrative    Not on file     Social Determinants of Health     Financial Resource Strain: Not on file   Food Insecurity: No Food Insecurity (2/27/2024)    Hunger Vital Sign     Worried About Running Out of Food in the Last Year: Never true     Ran Out of Food in the Last Year: Never true   Transportation Needs: Not on file   Physical Activity: Not on file   Stress: Stress Concern Present (2/27/2024)    [employer] - Occupational Stress Questionnaire     Feeling of Stress : To some extent   Social Connections: Not on file   Intimate Partner Violence: Not At Risk (2/27/2024)    Humiliation, Afraid, Rape, and Kick questionnaire     Fear of Current or Ex-Partner: No     Emotionally Abused: No     Physically Abused: No     Sexually Abused: No   Housing Stability: Not on file       FAMILY HISTORY  Family History   Problem Relation Name Age of Onset    Breast cancer Mother Mom     Diabetes Mother Mom     Early natural death Mother Mom     Hypertension Mother Mom     Breast cancer Mother's Sister Cordelia     Diabetes Mother's Sister Cordelia     Diabetes Maternal Grandmother [place]     Asthma Son Rudi     Asthma Daughter Maranda     Depression Daughter Maranda     Mental illness Brother Brother        REVIEW OF SYSTEMS  Except for those mentioned in the history of present illness, and below, a complete review of systems is negative.     Review of Systems     VITALS  There were no vitals filed for this visit.    PHYSICAL EXAMINATION   Reports no foot pain to palpation.    IMAGING STUDIES:   Radiographs of the left foot dated 4/23/2024 - questionable lucency at the base of the third metatarsal concerning for subacute fracture.     MRI of the left ankle dated 5/22/2024 was personally reviewed and interpreted by me, Dr. Marsha Magana, and the findings  shared with the patient.  Evidence of ATFL tear.  Focal marrow edema in the cuboid without fracture line.  Elongated anterior process the calcaneus that is irregular and fragmented and proximal medial corner fragmentation of the cuboid.  Small-moderate volume tibiotalar and subtalar effusions.    IMPRESSION  #1  Acute left lateral foot pain that started on 4/8/2024 concerning for midfoot sprain vs bifurcate ligament injury    PLAN  The following was discussed with the patient:     Mike Chase is a very pleasant 39 y.o. female with history of HLD, diabetes who is here for evaluation of left foot pain.  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  Additionally, she has some edema within the cuboid and evidence of chronic ATFL tear.  We reviewed her MRI today.  We discussed that she can wean out of the walking boot by first not wearing it while at home for a week and then wean out of it while walking in the community for a week but should continue to use it while at work.  If she is not having worsening of her pain, she can continue to wean out of the boot.  I was also given her a prescription for physical therapy.  She will follow-up with me in about 3 weeks, sometime after she returns from her trip on June 9.    The patient was counseled to remain active, but avoid activities that worsen symptoms. The patient was in agreement with this plan. All questions were answered to the best of my ability.    PATIENT EDUCATION:  Education was discussed at today's appointment. A learning needs assessment was performed.    Primary learner: Mike Chase  Barriers to learning: None  Preferred language: English  Learning preferences include: Seeing and doing.  Discussed: Diagnosis and treatment plan.  Demonstrated: Understanding of material discussed.  Patient education materials given: None.  Learner response: Learner demonstrated  understanding.    This note was dictated using Dragon speech recognition software and was not corrected for spelling or grammatical errors.    Patient seen and examined with PM&R resident, Dr. Rader. History, physical examination, pertinent imaging findings and the plan of care were discussed and I performed the key portions of the history, physical examination, and discussion of the plan of care.      Marsha Magana MD  [facility]

## 2024-06-10 ENCOUNTER — APPOINTMENT (OUTPATIENT)
Dept: ORTHOPEDIC SURGERY | Facility: CLINIC | Age: 39
End: 2024-06-10
Payer: COMMERCIAL

## 2024-06-25 ENCOUNTER — OFFICE VISIT (OUTPATIENT)
Dept: OBSTETRICS AND GYNECOLOGY | Facility: HOSPITAL | Age: 39
End: 2024-06-25
Payer: COMMERCIAL

## 2024-06-25 VITALS
DIASTOLIC BLOOD PRESSURE: 76 MMHG | WEIGHT: 293 LBS | SYSTOLIC BLOOD PRESSURE: 124 MMHG | HEIGHT: 64 IN | BODY MASS INDEX: 50.02 KG/M2

## 2024-06-25 DIAGNOSIS — Z23 NEED FOR HPV VACCINATION: Primary | ICD-10-CM

## 2024-06-25 DIAGNOSIS — Z01.419 WELL WOMAN EXAM: ICD-10-CM

## 2024-06-25 DIAGNOSIS — Z12.4 SCREENING FOR MALIGNANT NEOPLASM OF CERVIX: ICD-10-CM

## 2024-06-25 PROBLEM — L02.234 CARBUNCLE OF GROIN: Status: RESOLVED | Noted: 2023-09-08 | Resolved: 2024-06-25

## 2024-06-25 PROBLEM — R73.03 PREDIABETES: Status: ACTIVE | Noted: 2024-06-25

## 2024-06-25 PROBLEM — R73.03 PREDIABETES: Status: RESOLVED | Noted: 2024-06-25 | Resolved: 2024-06-25

## 2024-06-25 PROBLEM — R01.1 MURMUR, CARDIAC: Status: RESOLVED | Noted: 2023-02-02 | Resolved: 2024-06-25

## 2024-06-25 PROBLEM — J30.2 SEASONAL ALLERGIES: Status: ACTIVE | Noted: 2018-04-02

## 2024-06-25 PROBLEM — J06.9 UPPER RESPIRATORY TRACT INFECTION: Status: RESOLVED | Noted: 2024-06-25 | Resolved: 2024-06-25

## 2024-06-25 PROBLEM — M54.2 CERVICALGIA: Status: RESOLVED | Noted: 2023-06-21 | Resolved: 2024-06-25

## 2024-06-25 PROBLEM — J06.9 UPPER RESPIRATORY TRACT INFECTION: Status: ACTIVE | Noted: 2024-06-25

## 2024-06-25 PROBLEM — M23.90 INTERNAL DERANGEMENT OF KNEE: Status: RESOLVED | Noted: 2023-02-02 | Resolved: 2024-06-25

## 2024-06-25 PROBLEM — H10.45 CHRONIC ALLERGIC CONJUNCTIVITIS: Status: RESOLVED | Noted: 2023-02-02 | Resolved: 2024-06-25

## 2024-06-25 PROBLEM — H53.8 BLURRY VISION: Status: RESOLVED | Noted: 2023-02-02 | Resolved: 2024-06-25

## 2024-06-25 LAB — PREGNANCY TEST URINE, POC: NEGATIVE

## 2024-06-25 PROCEDURE — 4010F ACE/ARB THERAPY RXD/TAKEN: CPT | Performed by: STUDENT IN AN ORGANIZED HEALTH CARE EDUCATION/TRAINING PROGRAM

## 2024-06-25 PROCEDURE — 81025 URINE PREGNANCY TEST: CPT | Performed by: STUDENT IN AN ORGANIZED HEALTH CARE EDUCATION/TRAINING PROGRAM

## 2024-06-25 PROCEDURE — 99396 PREV VISIT EST AGE 40-64: CPT | Performed by: STUDENT IN AN ORGANIZED HEALTH CARE EDUCATION/TRAINING PROGRAM

## 2024-06-25 PROCEDURE — 3008F BODY MASS INDEX DOCD: CPT | Performed by: STUDENT IN AN ORGANIZED HEALTH CARE EDUCATION/TRAINING PROGRAM

## 2024-06-25 PROCEDURE — 3062F POS MACROALBUMINURIA REV: CPT | Performed by: STUDENT IN AN ORGANIZED HEALTH CARE EDUCATION/TRAINING PROGRAM

## 2024-06-25 PROCEDURE — 3074F SYST BP LT 130 MM HG: CPT | Performed by: STUDENT IN AN ORGANIZED HEALTH CARE EDUCATION/TRAINING PROGRAM

## 2024-06-25 PROCEDURE — 3078F DIAST BP <80 MM HG: CPT | Performed by: STUDENT IN AN ORGANIZED HEALTH CARE EDUCATION/TRAINING PROGRAM

## 2024-06-25 PROCEDURE — 90651 9VHPV VACCINE 2/3 DOSE IM: CPT | Performed by: STUDENT IN AN ORGANIZED HEALTH CARE EDUCATION/TRAINING PROGRAM

## 2024-06-25 SDOH — ECONOMIC STABILITY: FOOD INSECURITY: WITHIN THE PAST 12 MONTHS, THE FOOD YOU BOUGHT JUST DIDN'T LAST AND YOU DIDN'T HAVE MONEY TO GET MORE.: NEVER TRUE

## 2024-06-25 SDOH — ECONOMIC STABILITY: FOOD INSECURITY: WITHIN THE PAST 12 MONTHS, YOU WORRIED THAT YOUR FOOD WOULD RUN OUT BEFORE YOU GOT MONEY TO BUY MORE.: NEVER TRUE

## 2024-06-25 ASSESSMENT — PATIENT HEALTH QUESTIONNAIRE - PHQ9
2. FEELING DOWN, DEPRESSED OR HOPELESS: NOT AT ALL
1. LITTLE INTEREST OR PLEASURE IN DOING THINGS: NOT AT ALL
SUM OF ALL RESPONSES TO PHQ9 QUESTIONS 1 & 2: 0

## 2024-06-25 ASSESSMENT — ENCOUNTER SYMPTOMS
RESPIRATORY NEGATIVE: 0
ALLERGIC/IMMUNOLOGIC NEGATIVE: 0
GASTROINTESTINAL NEGATIVE: 0
MUSCULOSKELETAL NEGATIVE: 0
VISUAL CHANGE: 0
FATIGUE: 0
CONSTITUTIONAL NEGATIVE: 0
OCCASIONAL FEELINGS OF UNSTEADINESS: 0
ENDOCRINE NEGATIVE: 1
NEUROLOGICAL NEGATIVE: 1
POLYPHAGIA: 0
DEPRESSION: 0
WEAKNESS: 0
LOSS OF SENSATION IN FEET: 0
HEADACHES: 1
PSYCHIATRIC NEGATIVE: 1
CARDIOVASCULAR NEGATIVE: 0
BLURRED VISION: 0
POLYDIPSIA: 0
EYES NEGATIVE: 0
WEIGHT LOSS: 1

## 2024-06-25 ASSESSMENT — PAIN SCALES - GENERAL: PAINLEVEL: 0-NO PAIN

## 2024-06-25 NOTE — PROGRESS NOTES
Subjective   Mike Chase is a 39 y.o. y.o. here for a routine exam. Current concerns: severe cramping about once monthly but with amenorrhea from Nexplanon.      Gynecologic History  Patient's last menstrual period was: n/a - amenorrhea w Nexplanon  Contraception:  Nexplanon  Last Pap: 2020. 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 Results were: normal      Obstetric History  OB History    Para Term  AB Living   4 3 3   1 3   SAB IAB Ectopic Multiple Live Births     1     3      # Outcome Date GA Lbr Shay/2nd Weight Sex Delivery Anes PTL Lv   4 Term 05/15/17    M Vag-Spont EPI N IRVING   3 Term 05    M Vag-Spont EPI N IRVING   2 Term 98    F Vag-Spont EPI N IRVING   1 IAB                Objective   Physical Exam  Vitals and nursing note reviewed.   Constitutional:       Appearance: Normal appearance.   HENT:      Head: Normocephalic and atraumatic.      Nose: Nose normal.      Mouth/Throat:      Mouth: Mucous membranes are moist.   Eyes:      Extraocular Movements: Extraocular movements intact.      Conjunctiva/sclera: Conjunctivae normal.   Pulmonary:      Effort: Pulmonary effort is normal.   Chest:      Chest wall: No deformity or swelling.   Breasts:     Breasts are symmetrical.      Right: Normal.      Left: Normal.   Abdominal:      General: There is no distension.      Palpations: Abdomen is soft. There is no mass.      Tenderness: There is no abdominal tenderness.   Genitourinary:     General: Normal vulva.      Vagina: Normal.      Cervix: Normal.      Uterus: Normal.       Adnexa: Right adnexa normal and left adnexa normal.      Rectum: Normal.   Musculoskeletal:      Cervical back: Normal range of motion.   Skin:     General: Skin is warm and dry.   Neurological:      General: No focal deficit present.      Mental Status: She is alert.   Psychiatric:         Mood and Affect: Mood normal.          Behavior: Behavior normal.         Thought Content: Thought content normal.         Judgment: Judgment normal.         Assessment/Plan   Healthy female exam.    Advised high-dose NSAID/Tylenol combination for cramps with instructions for patient reference as follows:     Ibuprofen: 800 mg every 6 hours is the maximum dose.  OK to use smaller doses (400 mg or 600 mg) if these smaller doses are adequate to control pain, but may take up to 800.    Tylenol (acetaminophen): 1000 mg every 6 hours.  Taking at the same time as ibuprofen may actually increase effectiveness of ibuprofen.  1000 mg every 6 hours is the absolute maximum dose.  Even a little bit too much tylenol can cause liver damage so do not exceed this dose.  OK to use smaller doses (325 mg, 500 mg, or 650 mg) if these are adequate to control pain, but can use up to 1000 mg in a single dose.    Using ibuprofen and tylenol together, at the same time, is safe, and may also help both medications work better.    Diagnostic cotest today for hx CIN2 treated in 2012.  Continue screening q 3 years until 2037.    HPV vaccine series initiated today.    Follow up for well care or as needed.    Ness Castillo MD

## 2024-06-26 ENCOUNTER — APPOINTMENT (OUTPATIENT)
Dept: PRIMARY CARE | Facility: CLINIC | Age: 39
End: 2024-06-26
Payer: COMMERCIAL

## 2024-06-26 VITALS
WEIGHT: 293 LBS | HEART RATE: 65 BPM | BODY MASS INDEX: 50.02 KG/M2 | HEIGHT: 64 IN | DIASTOLIC BLOOD PRESSURE: 81 MMHG | SYSTOLIC BLOOD PRESSURE: 105 MMHG

## 2024-06-26 DIAGNOSIS — R51.9 CHRONIC DAILY HEADACHE: ICD-10-CM

## 2024-06-26 DIAGNOSIS — Z79.4 TYPE 2 DIABETES MELLITUS WITHOUT COMPLICATION, WITH LONG-TERM CURRENT USE OF INSULIN (MULTI): Primary | ICD-10-CM

## 2024-06-26 DIAGNOSIS — I10 BENIGN ESSENTIAL HYPERTENSION: ICD-10-CM

## 2024-06-26 DIAGNOSIS — G43.109 MIGRAINE WITH AURA AND WITHOUT STATUS MIGRAINOSUS, NOT INTRACTABLE: ICD-10-CM

## 2024-06-26 DIAGNOSIS — E11.9 TYPE 2 DIABETES MELLITUS WITHOUT COMPLICATION, WITH LONG-TERM CURRENT USE OF INSULIN (MULTI): Primary | ICD-10-CM

## 2024-06-26 LAB
CREAT UR-MCNC: 130.2 MG/DL (ref 20–320)
MICROALBUMIN UR-MCNC: <7 MG/L
MICROALBUMIN/CREAT UR: NORMAL MG/G{CREAT}
POC HEMOGLOBIN A1C: 5.6 % (ref 4.2–6.5)

## 2024-06-26 PROCEDURE — 3008F BODY MASS INDEX DOCD: CPT | Performed by: NURSE PRACTITIONER

## 2024-06-26 PROCEDURE — 82043 UR ALBUMIN QUANTITATIVE: CPT

## 2024-06-26 PROCEDURE — 1036F TOBACCO NON-USER: CPT | Performed by: NURSE PRACTITIONER

## 2024-06-26 PROCEDURE — 99214 OFFICE O/P EST MOD 30 MIN: CPT | Performed by: NURSE PRACTITIONER

## 2024-06-26 PROCEDURE — 82570 ASSAY OF URINE CREATININE: CPT

## 2024-06-26 PROCEDURE — 4010F ACE/ARB THERAPY RXD/TAKEN: CPT | Performed by: NURSE PRACTITIONER

## 2024-06-26 PROCEDURE — 3074F SYST BP LT 130 MM HG: CPT | Performed by: NURSE PRACTITIONER

## 2024-06-26 PROCEDURE — 3079F DIAST BP 80-89 MM HG: CPT | Performed by: NURSE PRACTITIONER

## 2024-06-26 PROCEDURE — 83036 HEMOGLOBIN GLYCOSYLATED A1C: CPT | Performed by: NURSE PRACTITIONER

## 2024-06-26 RX ORDER — INSULIN GLARGINE 100 [IU]/ML
35 INJECTION, SOLUTION SUBCUTANEOUS NIGHTLY
Qty: 36 ML | Refills: 1 | Status: SHIPPED | OUTPATIENT
Start: 2024-06-26 | End: 2025-01-01

## 2024-06-26 RX ORDER — TOPIRAMATE 50 MG/1
50 TABLET, FILM COATED ORAL 2 TIMES DAILY
Qty: 180 TABLET | Refills: 1 | Status: SHIPPED | OUTPATIENT
Start: 2024-06-26 | End: 2024-12-23

## 2024-06-26 RX ORDER — IBUPROFEN 800 MG/1
800 TABLET ORAL EVERY 8 HOURS PRN
Qty: 90 TABLET | Refills: 0 | Status: SHIPPED | OUTPATIENT
Start: 2024-06-26

## 2024-06-26 RX ORDER — FLASH GLUCOSE SENSOR
KIT MISCELLANEOUS
Qty: 6 EACH | Refills: 3 | Status: SHIPPED | OUTPATIENT
Start: 2024-06-26

## 2024-06-26 RX ORDER — FLASH GLUCOSE SCANNING READER
EACH MISCELLANEOUS
Qty: 1 EACH | Refills: 0 | Status: SHIPPED | OUTPATIENT
Start: 2024-06-26

## 2024-06-26 RX ORDER — ATORVASTATIN CALCIUM 20 MG/1
20 TABLET, FILM COATED ORAL DAILY
Qty: 90 TABLET | Refills: 1 | Status: SHIPPED | OUTPATIENT
Start: 2024-06-26 | End: 2024-12-23

## 2024-06-26 RX ORDER — METFORMIN HYDROCHLORIDE 500 MG/1
500 TABLET ORAL 2 TIMES DAILY
Qty: 180 TABLET | Refills: 3 | Status: SHIPPED | OUTPATIENT
Start: 2024-06-26 | End: 2025-06-21

## 2024-06-26 RX ORDER — LOSARTAN POTASSIUM 25 MG/1
25 TABLET ORAL DAILY
Qty: 90 TABLET | Refills: 1 | Status: SHIPPED | OUTPATIENT
Start: 2024-06-26 | End: 2024-12-23

## 2024-06-26 ASSESSMENT — ENCOUNTER SYMPTOMS
OCCASIONAL FEELINGS OF UNSTEADINESS: 0
DEPRESSION: 0
LOSS OF SENSATION IN FEET: 0

## 2024-06-26 NOTE — PROGRESS NOTES
"Subjective   Patient ID: Mike Chase is a 39 y.o. female who presents for chronic care.    HPI     1. T2DM  Last HgA1c: 6.0% on 10/24/2023  Current meds: metformin 500 mg BID, basaglar 35 units QHS  Home glucose monitoring: once or twice per week, runs in the 90s in the mornings  Diet: fair   Exercise: walking once per week   Statin: Atorvastatin 20 mg daily  ACEI: no, ARB losartan   ASA: none  Last urine albumin: 3/16/23  Diabetic foot exam: 6/15/22, declined   Vision exam: 2023  Dental cleaning: March 2024  Pneumovax: 2013     Denies polyuria, polydipsia, vision changes, yeast infections, or neuropathy.   Denies any episodes of hypoglycemia that she is aware of.    2. Hypertension  Current meds: losartan 25 mg every day   Home blood pressure monitoring: none  Diet: as above  Exercise: as above  Alcohol use: occasional  Tobacco use: none  Illicit drug use: none    Denies vision changes, headaches, dizziness, chest pain, palpitations, or edema.    3. Migraines  Well controlled with topiramate  Has not had a migraine in a couple months  Believes her triggers are stress  She gets an aura, light sensitivity, and occasional nausea      Review of Systems  ROS negative except as noted above in HPI.     Objective   /81   Pulse 65   Ht 1.626 m (5' 4\")   Wt (!) 155 kg (341 lb)   BMI 58.53 kg/m²     Physical Exam  General: Alert and oriented, in no acute distress. Appears stated age, well-nourished, and well hydrated  HEENT:  - Head: Normocephalic and atraumatic   - Eyes: EOMI, PERRLA  - ENT: Hearing grossly intact  Heart: RRR. No murmurs, clicks, or rubs  Lungs: Unlabored breathing. CTAB with no crackles, wheezes, or rhonchi  Abdomen: Normal BS in all 4 quadrants. Soft, non-tender, non-distended, with no masses  Extremities: Warm and well perfused. No edema. Normal peripheral pulses  Musculoskeletal: Normal gait and station  Neurological: Alert and oriented. No gross neurological deficits  Psychological: " Appropriate mood and affect  Skin: No rash, abnormal lesions, cyanosis, or erythema    Assessment/Plan     1. T2DM  - IO HgA1c 5.6%  - Continue metfomin 500 mg BID, basaglar 25 units at bedtime   - Prescription sent for Freestyle Nia   - Continue atorvastatin for ASCVD risk   - Urine albumin collected in office  - Declined foot exam  - Retinopathy screenings 1x/year and dental cleanings 2x/year    2. Hypertension  - Well controlled  - Continue losartan 25 mg every day  - Monitor BP at home     3. Migraines  - Well controlled  - Continue topiramate 50 mg BID and ubrelvy prn   - Avoidance of known triggers     RTC in 6 months or sooner prn    THEODORE Kinney-CNP  [facility]

## 2024-07-08 LAB
CYTOLOGY CMNT CVX/VAG CYTO-IMP: NORMAL
HPV HR 12 DNA GENITAL QL NAA+PROBE: NEGATIVE
HPV HR GENOTYPES PNL CVX NAA+PROBE: NEGATIVE
HPV16 DNA SPEC QL NAA+PROBE: NEGATIVE
HPV18 DNA SPEC QL NAA+PROBE: NEGATIVE
LAB AP HPV GENOTYPE QUESTION: YES
LAB AP HPV HR: NORMAL
LAB AP PREVIOUS ABNORMAL HISTORY: NORMAL
LAB AP TREATMENT HISTORY: NORMAL
LABORATORY COMMENT REPORT: NORMAL
PATH REPORT.TOTAL CANCER: NORMAL

## 2024-07-22 ENCOUNTER — APPOINTMENT (OUTPATIENT)
Dept: INTEGRATIVE MEDICINE | Facility: CLINIC | Age: 39
End: 2024-07-22
Payer: COMMERCIAL

## 2024-07-22 DIAGNOSIS — M54.50 CHRONIC BILATERAL LOW BACK PAIN WITHOUT SCIATICA: ICD-10-CM

## 2024-07-22 DIAGNOSIS — M99.05 SEGMENTAL AND SOMATIC DYSFUNCTION OF PELVIC REGION: ICD-10-CM

## 2024-07-22 DIAGNOSIS — M99.02 SEGMENTAL AND SOMATIC DYSFUNCTION OF THORACIC REGION: Primary | ICD-10-CM

## 2024-07-22 DIAGNOSIS — M99.03 SEGMENTAL AND SOMATIC DYSFUNCTION OF LUMBAR REGION: ICD-10-CM

## 2024-07-22 DIAGNOSIS — G89.29 CHRONIC BILATERAL LOW BACK PAIN WITHOUT SCIATICA: ICD-10-CM

## 2024-07-22 DIAGNOSIS — M54.2 CERVICALGIA: ICD-10-CM

## 2024-07-22 PROCEDURE — 98941 CHIROPRACT MANJ 3-4 REGIONS: CPT | Performed by: CHIROPRACTOR

## 2024-07-22 NOTE — PROGRESS NOTES
Subjective   Patient ID: Mike Chase is a 39 y.o. female who presents July 22, 2024 for back and neck pain.    3/15 VPCY    Today, the patient rates their degree of pain as a 5 out of 10 on the numeric pain rating scale.     HPI : Mike returns to my office for chiropractic care with main complaint of upper back and neck tension and lower back discomfort. She denies new trauma or changes to health since the time of our last visit in May. She is no longer in a walking boot and feels the left ankle has improved overall. She reports discomfort along the bilateral neck into the upper trapezius region. She reports central lower back discomfort with radicular complaints. No other change to health noted.      Objective   Physical Exam  Neurological:      General: No focal deficit present.      Mental Status: She is alert and oriented to person, place, and time.      Cranial Nerves: No dysarthria or facial asymmetry.      Sensory: Sensation is intact.      Motor: Motor function is intact.      Coordination: Coordination is intact.      Gait: Gait is intact. Gait normal.         Palpation of the following region(s) revealed:  Cervical: Upper trapezius bilateral, muscular hypertonicity.  Cervical paraspinals bilateral, muscular hypertonicity.  Thoracic: Rhomboids right, muscular hypertonicity.  Pectoralis bilateral, muscular hypertonicity.  Lumbar: Lumbar paraspinals left, muscular hypertonicity.  Quadratus lumborum right, muscular hypertonicity.  Gluteal bilateral, muscular hypertonicity.  Piriformis bilateral, muscular hypertonicity.        Segmental Joint(s): Segmental joint dysfunction was assessed with motion palpation and is identified in the following areas:  Cervical : C6  Thoracic : T3, T4, T7, and T8  Lumbopelvic / Sacral SIJ : L4, L5/S1, R SIJ, and L SIJ      Assessment/Plan   Today's Treatment Included: Chiropractic manipulation to the Segmental Joint(s) Cervical : C6 Segmental Joint(s) Lumbopelvic/Sacral SIJ :  L4, L5/S1, R SIJ, and L SIJ Segmental Joint(s) Thoracic : T3, T4, T7, and T8   Treatment Techniques Used : Diversified CMT, Activator/Tool assisted technique, and Pelvic drop table technique  Integrative Dry Needling (IDN) - Needles in / out:  6.  IDN: BL upper trap, C7-T1 PS L4-5 PS    Soft-tissue mobilization was performed in the following areas:   Cervical paraspinal mm. bilateral and Upper Trapezius bilateral  Middle Trapezius bilateral, Rhomboids bilateral, and Pectoralis bilateral  Gluteal mm. Glute. Med. bilateral and Piriformis bilateral    F/U as benefiical and needed    The patient tolerated today's treatment with little or no additional discomfort and was instructed to contact the office for questions or concerns.

## 2024-08-05 ENCOUNTER — PROCEDURE VISIT (OUTPATIENT)
Dept: OBSTETRICS AND GYNECOLOGY | Facility: HOSPITAL | Age: 39
End: 2024-08-05
Payer: COMMERCIAL

## 2024-08-05 DIAGNOSIS — Z23 NEED FOR HPV VACCINATION: ICD-10-CM

## 2024-08-05 PROCEDURE — 90651 9VHPV VACCINE 2/3 DOSE IM: CPT

## 2024-08-05 NOTE — PROGRESS NOTES
Patient here for Gardasil injection #2, injection #1 given 6/25/2024  Patient educated that she will need to return to the clinic in 6 months for #3 dose (Feb 2025)  Reviewed s/s of reaction and when to seek medical attention.  Patient verbalized understanding and denies any further questions or concerns  Injection administered as documented.   Patient tolerated well.

## 2024-08-07 ENCOUNTER — EVALUATION (OUTPATIENT)
Dept: PHYSICAL THERAPY | Facility: CLINIC | Age: 39
End: 2024-08-07
Payer: COMMERCIAL

## 2024-08-07 DIAGNOSIS — M25.572 LEFT ANKLE PAIN: Primary | ICD-10-CM

## 2024-08-07 DIAGNOSIS — S93.312A CUBOID SYNDROME OF LEFT FOOT: ICD-10-CM

## 2024-08-07 DIAGNOSIS — S92.026S: ICD-10-CM

## 2024-08-07 DIAGNOSIS — M25.672 ANKLE STIFFNESS, LEFT: ICD-10-CM

## 2024-08-07 PROCEDURE — 97535 SELF CARE MNGMENT TRAINING: CPT | Mod: GP | Performed by: PHYSICAL THERAPIST

## 2024-08-07 PROCEDURE — 97110 THERAPEUTIC EXERCISES: CPT | Mod: GP | Performed by: PHYSICAL THERAPIST

## 2024-08-07 PROCEDURE — 97161 PT EVAL LOW COMPLEX 20 MIN: CPT | Mod: GP | Performed by: PHYSICAL THERAPIST

## 2024-08-07 ASSESSMENT — PAIN SCALES - GENERAL: PAINLEVEL_OUTOF10: 0 - NO PAIN

## 2024-08-07 ASSESSMENT — PAIN - FUNCTIONAL ASSESSMENT: PAIN_FUNCTIONAL_ASSESSMENT: 0-10

## 2024-08-07 NOTE — PROGRESS NOTES
Physical Therapy  Physical Therapy Orthopedic Evaluation    Patient Name: Mike Chase  MRN: [id number]  Today's Date: 8/7/2024  Time Calculation  Start Time: 1107  Stop Time: 1154  Time Calculation (min): 47 min    Insurance:  Visit number: 1 of 6  Authorization info: Auth required  Insurance Type: [employer]  Cert start date: 8/7/24; Cert end date: 10/16/24     General:  Reason for visit:   Diagnosis   S92.026S (ICD-10-CM) - Closed nondisplaced fracture of anterior process of calcaneus, sequela   S93.312A (ICD-10-CM) - Cuboid syndrome of left foot     Referred by: Dr. Marsha Magana    Assessment: Patient presents with signs and symptoms consistent with L ankle pain/stiffness, including postural impairments, L ankle ROM deficits, L knee ROM deficits, subtalar hypombility, ankle/foot strength deficits, and impaired SL balance, resulting in limited participation in pain-free ADLs and inability to perform at their prior level of function. Pt would benefit from physical therapy to address the impairments found & listed previously in the objective section in order to return to safe and pain-free ADLs and prior level of function.       Clinic Presentation: Stable  Level of Complexity: Low  Prognosis: Good    Plan:     Planned Interventions include: therapeutic exercise, self-care home management, manual therapy, therapeutic activities, gait training, neuromuscular coordination, vasopneumatic, dry needling, aquatic therapy  Frequency: 1 x Week  Duration: 6 Weeks    Patient and/or family understands and agrees with goals and plan documented.    Current Problem:  1. Left ankle pain  Follow Up In Physical Therapy      2. Closed nondisplaced fracture of anterior process of calcaneus, sequela  Referral to Physical Therapy      3. Cuboid syndrome of left foot  Referral to Physical Therapy      4. Ankle stiffness, left  Follow Up In Physical Therapy          Precautions:   Precautions  STEADI Fall Risk Score (The score of 4  "or more indicates an increased risk of falling): 3  Precautions Comment: DM, HTN      Medical History Form: Reviewed (scanned into chart)    Subjective:   Subjective   Chief Complaint: Pt presents to PT with c/o L ankle pain s/p ankle inversion injury sustained 4/8/24 while tripping over a lawn chair. See imaging below. Pt donning walking boot and ambulating with crutches for ~2 months with improvement in pain/swelling. Of note, h/o R/L knee pain. R knee meniscal repair 2018, L knee pain beginning while wearing the walking boot.   Onset: 4/8/24  JULES: Inversion injury    Current Condition:   Better    Pain:  Pain Assessment: 0-10  0-10 (Numeric) Pain Score: 0 - No pain  Location: Plantar surface of foot  Description: Tension  Aggravating Factors:  Inversion/PF  Relieving Factors:  None  Pain Intensity:  0/10-7/10    Relevant Information (PMH & Previous Tests/Imaging): HLD, diabetes, migraines/HA, HBP, h/o R meniscal repair 2018     Radiographs of the left foot dated 4/23/2024 - Questionable lucency at the base of the third metatarsal concerning for subacute fracture.     MRI of the left ankle dated 5/22/2024 - Evidence of ATFL tear.  Focal marrow edema in the cuboid without fracture line.  Elongated anterior process the calcaneus that is irregular and fragmented and proximal medial corner fragmentation of the cuboid.  Small-moderate volume tibiotalar and subtalar effusions.    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, WFH, desk work      Patients Living Environment: Reviewed and no concern    Primary Language: English    Patient's Goal(s) for Therapy: \"Be able to be my active self\"    Patient Specific Functional Scale (0 = unable to perform; 10 = able to perform activity at same level as before injury or " "problem)  Activity Current Follow Up Discharge   Ines 5 year old 4     Exercise 4           Total score = sum of the activity scores/number of activities  Minimum detectable change (90%CI) for average score = 2 points  Minimum detectable change (90%CI) for single activity score = 3 points    Red Flags: Do you have any of the following? No  Fever/chills, unexplained weight changes, dizziness/fainting, unexplained change in bowel or bladder functions, unexplained malaise or muscle weakness, night pain/sweats, numbness or tingling    Objective:  Objective     Posture: Calcaneal valgus B, increased weight bearing lateral aspect of foot B, B knee valgus, excessive lumbar lordosis, decreased knee extension L, iliac crest = height    Palpation: TTP lateral plantar fascia    Ankle AROM  DF R 14 deg; L 13 deg  PF R 53 deg; L 44 deg  Inv R 33 deg; L 25 deg  Ev R -2 deg (\"tight\"); L 2 deg (\"tight\")  Great toe ext R 72 deg (88 deg passively); L 67 deg (87 deg passively)    Knee AROM:  Extension R 6 deg; L 0 deg (pain)  Flexion R 118 deg; L 100 deg (pain)    Ankle MMT  DF R 4+/5; L 5/5  Inv R 4+/5; L 4+/5  Ev R 4-/5 (pain); L 4/5  Great toe ext R 4+/5; L 4+/5  Great toe flex R 4/5; L 4/5  Toe ext R 4+/5; L 4+/5  Toe flex R 4/5; L 4/5    Gross Hip/Knee MMT:  - R 4/5 hip/knee  - L 4/5 hip, 3+/5 knee    Special Tests:  Ankle Sprain:  - Medial Tilt Test: (-)  - Lateral Tilt Test: (-)  - Anterior Drawer Test: (-)    SL Heel Raise  R 8  L 6    Joint Mobility  - Talocrural: R WFL; L WFL  - Subtalar: R hypo laterally; L hypo laterally    SL Balance  - R 20+s  - L 11s    Gait: Decreased stance time LLE    Outcome Measures:  Other Measures  Lower Extremity Funtional Score (LEFS): 69/80     EDUCATION: Home exercise program, plan of care, activity modifications, pain management, and injury pathology       Goals: Set and discussed today  Active       PT Problem       STG       Start:  08/07/24    Expected End:  09/21/24       Patient will " demonstrate home exercise program adherence to supplement symptom reduction and functional gains made in clinic          LTG       Start:  08/07/24    Expected End:  11/05/24       Patient will verbalize pain (0-10) <3/10 at worst to improve ADL tolerance  Pt will demonstrate B ankle eversion AROM 10 degrees to improve joint mechanics during functional mobility   Pt will demonstrate gross ankle MMT 5/5 to assist in return to PLOF   Pt will demonstrate gross foot intrinsic MMT 4+/5 to improve functional stability with SL tasks   Pt will perform 15 SL heel raises R/L without pain to improve ease with playing with 7 year old  Pt will demonstrate MCID (9 points) improvement on LEFS score to demonstrate improved functional ability  Pt will demonstrate MCID improvement on PSFS to demonstrate return to PLOF             Plan of care was developed with input and agreement by the patient    Treatment Performed:    HEP: Ankle ABC, windshield wipers, standing heel raise (post tib), toe yoga   Access Code: [email or web address]    Charges:  Evaluation: low complexity  Treatment: 1 TE, 1 self care    Aria Patino, PT

## 2024-08-12 NOTE — PROGRESS NOTES
"Physical Therapy  Physical Therapy Treatment    Patient Name: Mike Chase  MRN: [id number]  Today's Date: 8/13/2024  Time Calculation  Start Time: 1500  Stop Time: 1530  Time Calculation (min): 30 min    Insurance:  Visit number: 2 of 6  Authorization info: Auth required  Insurance Type: [employer]  Cert start date: 8/7/24; Cert end date: 10/16/24      General:  Reason for visit:   Diagnosis   S92.026S (ICD-10-CM) - Closed nondisplaced fracture of anterior process of calcaneus, sequela   S93.312A (ICD-10-CM) - Cuboid syndrome of left foot       Assessment: Postural impairments including increased weight shift through lateral foot/ankle inversion impacting heel raise technique. Some improvement with cuing. Challenged by strength and balance drills. Denies pain at end of session.       Plan: Progress ankle/foot strength and balance NV. Include hip strengthening. Consider subtalar glides to promote ankle mobility        Current Problem  1. Left ankle pain  Follow Up In Physical Therapy      2. Ankle stiffness, left  Follow Up In Physical Therapy          Precautions:   Precautions  STEADI Fall Risk Score (The score of 4 or more indicates an increased risk of falling): 3  Precautions Comment: DM, HTN    Subjective:  Subjective   Pt arriving 15 min late. Denies pain upon arrival. Reports HEP adherence every other day.     Pain  Pain Assessment: 0-10  0-10 (Numeric) Pain Score: 0 - No pain    Performing HEP?: Partially    Objective:  Objective   Ankle inversion with heel raise despite cuing/foot position    Treatments:  Exercise:  - Recumbant bike L2 5'  - Ankle ABCs x1  - Windshield wipers x10  - Standing heel raise with ball (post tib) x20, x15  - 2 way heel raise 2x10  - Toe yoga x20 (min assist, improving with repetition)  - Seated heel raise 4kg KB 2x15  - SLS air ex 2x30\" R/L  - SL ball toss x10 R/L    Consider subtalar glides NV    HEP: Ankle ABC, windshield wipers, standing heel raise (post tib), toe yoga   Access " Code: [email or web address]    Charges:     Aria Patino, PT

## 2024-08-13 ENCOUNTER — TREATMENT (OUTPATIENT)
Dept: PHYSICAL THERAPY | Facility: CLINIC | Age: 39
End: 2024-08-13
Payer: COMMERCIAL

## 2024-08-13 DIAGNOSIS — M25.672 ANKLE STIFFNESS, LEFT: ICD-10-CM

## 2024-08-13 DIAGNOSIS — M25.572 LEFT ANKLE PAIN: ICD-10-CM

## 2024-08-13 PROCEDURE — 97110 THERAPEUTIC EXERCISES: CPT | Mod: GP | Performed by: PHYSICAL THERAPIST

## 2024-08-13 PROCEDURE — 97112 NEUROMUSCULAR REEDUCATION: CPT | Mod: GP | Performed by: PHYSICAL THERAPIST

## 2024-08-13 ASSESSMENT — PAIN - FUNCTIONAL ASSESSMENT: PAIN_FUNCTIONAL_ASSESSMENT: 0-10

## 2024-08-13 ASSESSMENT — PAIN SCALES - GENERAL: PAINLEVEL_OUTOF10: 0 - NO PAIN

## 2024-08-19 NOTE — PROGRESS NOTES
"Physical Therapy  Physical Therapy Treatment    Patient Name: Mike Chase  MRN: [id number]  Today's Date: 8/20/2024  Time Calculation  Start Time: 1600  Stop Time: 1641  Time Calculation (min): 41 min    Insurance:  Visit number: 3 of 6  Authorization info: Auth required  Insurance Type: [employer]  Cert start date: 8/7/24; Cert end date: 10/16/24      General:  Reason for visit:   Diagnosis   S92.026S (ICD-10-CM) - Closed nondisplaced fracture of anterior process of calcaneus, sequela   S93.312A (ICD-10-CM) - Cuboid syndrome of left foot       Assessment: Challenged by SL balance promoting equal weight distribution through foot. Notes increased L lateral ankle pain during this. Appropriately challenged by exercise. At end of session, pt reports decrease in foot pain to 3-4/10 pain. Adding SL balance with TB to HEP.       Plan: Progress ankle/foot strength and balance NV. Include hip strengthening. Consider subtalar glides to promote ankle mobility        Current Problem  1. Left ankle pain  Follow Up In Physical Therapy      2. Ankle stiffness, left  Follow Up In Physical Therapy            Precautions:   Precautions  STEADI Fall Risk Score (The score of 4 or more indicates an increased risk of falling): 3  Precautions Comment: DM, HTN    Subjective:  Subjective   Pt denies ankle pain, however, reports lateral foot pain, rating as 5-6/10. Unsure what may have increased her pain. Reports HEP adherence.     Pain  Pain Assessment: 0-10  0-10 (Numeric) Pain Score: 6    Performing HEP?: Partially    Objective:  Objective   Ankle inversion with toe raise    Treatments:  Exercise:  - Upright bike L2 5'  - Standing heel raise with ball (post tib) 2x15  - 2 way heel raise 2x10  - SLS (blue TB under 1st MTP) x60\" R/L  - Seated heel raise 8kg KB 2x10  - DBE 2' ea  - SLS air ex 2x30\" R/L  - BOSU lunge 2x15  - SS RTB (band at feet) x4 laps // bars  - Seated heel raise 2x15  - SL ball toss x10 R/L - held 8/20    HEP: Ankle ABC, " deja espana, standing heel raise (post tib), toe yoga   Access Code: [email or web address]    Charges: 1 NMR, 2 TE    Aria Patino, PT

## 2024-08-20 ENCOUNTER — TREATMENT (OUTPATIENT)
Dept: PHYSICAL THERAPY | Facility: CLINIC | Age: 39
End: 2024-08-20
Payer: COMMERCIAL

## 2024-08-20 DIAGNOSIS — M25.672 ANKLE STIFFNESS, LEFT: ICD-10-CM

## 2024-08-20 DIAGNOSIS — M25.572 LEFT ANKLE PAIN: ICD-10-CM

## 2024-08-20 PROCEDURE — 97112 NEUROMUSCULAR REEDUCATION: CPT | Mod: GP | Performed by: PHYSICAL THERAPIST

## 2024-08-20 PROCEDURE — 97110 THERAPEUTIC EXERCISES: CPT | Mod: GP | Performed by: PHYSICAL THERAPIST

## 2024-08-20 ASSESSMENT — PAIN - FUNCTIONAL ASSESSMENT: PAIN_FUNCTIONAL_ASSESSMENT: 0-10

## 2024-08-20 ASSESSMENT — PAIN SCALES - GENERAL: PAINLEVEL_OUTOF10: 6

## 2024-08-21 ENCOUNTER — OFFICE VISIT (OUTPATIENT)
Dept: ORTHOPEDIC SURGERY | Facility: HOSPITAL | Age: 39
End: 2024-08-21
Payer: COMMERCIAL

## 2024-08-21 DIAGNOSIS — S92.025G CLOSED NONDISPLACED FRACTURE OF ANTERIOR PROCESS OF LEFT CALCANEUS WITH DELAYED HEALING, SUBSEQUENT ENCOUNTER: Primary | ICD-10-CM

## 2024-08-21 PROCEDURE — 99213 OFFICE O/P EST LOW 20 MIN: CPT | Performed by: STUDENT IN AN ORGANIZED HEALTH CARE EDUCATION/TRAINING PROGRAM

## 2024-08-21 PROCEDURE — 3062F POS MACROALBUMINURIA REV: CPT | Performed by: STUDENT IN AN ORGANIZED HEALTH CARE EDUCATION/TRAINING PROGRAM

## 2024-08-21 PROCEDURE — 4010F ACE/ARB THERAPY RXD/TAKEN: CPT | Performed by: STUDENT IN AN ORGANIZED HEALTH CARE EDUCATION/TRAINING PROGRAM

## 2024-08-21 ASSESSMENT — PAIN - FUNCTIONAL ASSESSMENT: PAIN_FUNCTIONAL_ASSESSMENT: 0-10

## 2024-08-21 ASSESSMENT — PAIN SCALES - GENERAL: PAINLEVEL_OUTOF10: 3

## 2024-08-21 NOTE — PROGRESS NOTES
REFERRAL SOURCE: No ref. provider found     CHIEF COMPLAINT: left foot pain    HISTORY OF PRESENT ILLNESS  Mike Chase is a very pleasant 39 y.o. female with history of HLD, diabetes who is here for follow-up of left foot pain.     Previous history:  4/23/24: On 4/8/2023 she was carrying a lawn chair and tripped and had an inversion injury and felt a pop in the lateral ankle.  She presented the next day to orthopedic injury clinic and was diagnosed with a nondisplaced fourth and third metatarsal fracture and placed in a boot and given crutches.  Currently, the pain is over the lateral aspect of her midfoot near the cuboid.  She said that she noted acute swelling which has improved.  She continues to have pain with weightbearing.  She has been weightbearing using the boot, but notes significant soreness by the end of the day.  Pain is achy and throbbing at the end of the day and can be sharp with inversion movements.  Denies pain in the ankle.    5/7/24: She reports improved swelling but no improvements in pain. She is still in crutches and using a walking boot. She is taking Tylenol for pain.  Notes worsening pain towards the end of the day when she cannot keep her foot elevated consistently.     5/24/24: She has been wearing the boot and doesn't have pain in the foot while in boot. Swelling has improved.  She does report prior injury to the ankle, which her mother recalled, that happened 10-15 years ago and she thought she sprained her ankle.    Interval history:  8/21/24: Reports improvement in her pain after coming out of the boot.  Now, she has pain primarily on the plantar aspect of the foot.  Her lateral pain has improved.    MEDS    Current Outpatient Medications:     albuterol (Ventolin HFA) 90 mcg/actuation inhaler, Inhale 2 puffs every 4 hours if needed for wheezing or shortness of breath., Disp: 18 g, Rfl: 0    atorvastatin (Lipitor) 20 mg tablet, Take 1 tablet (20 mg) by mouth once daily., Disp: 90  "tablet, Rfl: 1    blood sugar diagnostic (Accu-Chek Guide test strips) strip, Use to check blood sugar 4 times daily, Disp: 200 strip, Rfl: 3    cetirizine (ZyrTEC) 10 mg tablet, Take 1 tablet (10 mg) by mouth once daily., Disp: 90 tablet, Rfl: 0    fluticasone (Flonase) 50 mcg/actuation nasal spray, Administer 1 spray into each nostril once daily. Shake gently. Before first use, prime pump. After use, clean tip and replace cap., Disp: 16 g, Rfl: 2    FreeStyle Nia 2 [place] misc, Use as instructed, Disp: 1 each, Rfl: 0    FreeStyle Nia 2 Sensor kit, Use one sensor every 14 days, Disp: 6 each, Rfl: 3    glucagon (Glucagen) 1 mg injection, Inject 1 mg into the muscle 1 time if needed for low blood sugar - see comments (blood sugar under 70) for up to 4 doses. USE AS DIRECTED., Disp: 2 each, Rfl: 1    hydrocortisone 2.5 % ointment, , Disp: , Rfl:     hydrOXYzine HCL (Atarax) 25 mg tablet, Take 1 tablet (25 mg) by mouth every 8 hours if needed for anxiety., Disp: 60 tablet, Rfl: 0    ibuprofen 800 mg tablet, Take 1 tablet (800 mg) by mouth every 8 hours if needed for moderate pain (4 - 6)., Disp: 90 tablet, Rfl: 0    insulin glargine (Basaglar KwikPen U-100 Insulin) 100 unit/mL (3 mL) pen, Inject 35 Units under the skin once daily at bedtime., Disp: 36 mL, Rfl: 1    ketoconazole (NIZOral) 2 % shampoo, , Disp: , Rfl:     ketotifen (Zaditor) 0.025 % (0.035 %) ophthalmic solution, Administer 1 drop into both eyes 2 times a day., Disp: 10 mL, Rfl: 0    losartan (Cozaar) 25 mg tablet, Take 1 tablet (25 mg) by mouth once daily., Disp: 90 tablet, Rfl: 1    metFORMIN (Glucophage) 500 mg tablet, Take 1 tablet (500 mg) by mouth 2 times a day. Take with food., Disp: 180 tablet, Rfl: 3    pen needle, diabetic (BD Ultra-Fine Short Pen Needle) 31 gauge x 5/16\" needle, Inject 1 each under the skin once daily. 31G x 8MM; use once daily, Disp: 90 each, Rfl: 3    topiramate (Topamax) 50 mg tablet, Take 1 tablet (50 mg) by mouth 2 " times a day., Disp: 180 tablet, Rfl: 1    triamcinolone (Kenalog) 0.1 % ointment, , Disp: , Rfl:     ubrogepant (Ubrelvy) 100 mg tablet tablet, Take 1 tablet (100 mg) by mouth 1 time if needed (migraine. May repeat in 2 hours if symptoms persist or return. Max dose 200 mg/24 hr) for up to 16 doses., Disp: 16 tablet, Rfl: 0    ALLERGIES  Allergies   Allergen Reactions    Lisinopril Angioedema     Lip swelling       PAST MEDICAL HISTORY  Past Medical History:   Diagnosis Date    Allergic rhinitis due to pollen 05/26/2016    Allergic rhinitis due to pollen    Arthritis     Blurry vision 02/02/2023    Carbuncle of groin 09/08/2023    Cervical intraepithelial neoplasia grade 2 2012    LEEP performed 2012, subsequent screening all negative    Cervicalgia 06/21/2023    Chronic allergic conjunctivitis 02/02/2023    Depression     Heart murmur     Internal derangement of knee 02/02/2023    Migraine, unspecified, not intractable, without status migrainosus 07/22/2016    Headache, migraine    Murmur, cardiac 02/02/2023    Other chronic diseases of tonsils and adenoids 09/13/2016    Tonsil stone       PAST SURGICAL HISTORY  Past Surgical History:   Procedure Laterality Date    CERVICAL BIOPSY  W/ LOOP ELECTRODE EXCISION  2012    Cervical Loop Electrosurgical Excision (LEEP)    CYST REMOVAL  10/04/2023    Dermal cystic    KNEE ARTHROSCOPY W/ MENISCAL REPAIR  10/2018    WISDOM TOOTH EXTRACTION         SOCIAL HISTORY   Social History     Socioeconomic History    Marital status: Single     Spouse name: Not on file    Number of children: Not on file    Years of education: Not on file    Highest education level: Not on file   Occupational History    Not on file   Tobacco Use    Smoking status: Never    Smokeless tobacco: Never   Vaping Use    Vaping status: Never Used   Substance and Sexual Activity    Alcohol use: Yes     Alcohol/week: 1.0 standard drink of alcohol     Types: 1 Standard drinks or equivalent per week     Comment:  occasionally    Drug use: Never    Sexual activity: Not Currently     Partners: Male     Birth control/protection: Implant   Other Topics Concern    Not on file   Social History Narrative    Not on file     Social Determinants of Health     Financial Resource Strain: Not on file   Food Insecurity: No Food Insecurity (6/25/2024)    Hunger Vital Sign     Worried About Running Out of Food in the Last Year: Never true     Ran Out of Food in the Last Year: Never true   Transportation Needs: Not on file   Physical Activity: Not on file   Stress: Stress Concern Present (2/27/2024)    [employer] [facility] [employer] [facility] - Occupational Stress Questionnaire     Feeling of Stress : To some extent   Social Connections: Not on file   Intimate Partner Violence: Not At Risk (2/27/2024)    Humiliation, Afraid, Rape, and Kick questionnaire     Fear of Current or Ex-Partner: No     Emotionally Abused: No     Physically Abused: No     Sexually Abused: No   Housing Stability: Not on file       FAMILY HISTORY  Family History   Problem Relation Name Age of Onset    Breast cancer Mother Mom     Diabetes Mother Mom     Early natural death Mother Mom     Hypertension Mother Mom     Breast cancer Mother's Sister Cordelia     Diabetes Mother's Sister Cordelia     Diabetes Maternal Grandmother Mick     Asthma Son Rudi     Asthma Daughter Maranda     Depression Daughter Maranda     Mental illness Brother Brother        REVIEW OF SYSTEMS  Except for those mentioned in the history of present illness, and below, a complete review of systems is negative.     Review of Systems     VITALS  There were no vitals filed for this visit.    PHYSICAL EXAMINATION   Reports no foot pain to palpation.    IMAGING STUDIES:   Radiographs of the left foot dated 4/23/2024 - questionable lucency at the base of the third metatarsal concerning for subacute fracture.     MRI of the left ankle dated 5/22/2024 -Evidence of ATFL tear.  Focal marrow edema in the cuboid  without fracture line.  Elongated anterior process the calcaneus that is irregular and fragmented and proximal medial corner fragmentation of the cuboid.  Small-moderate volume tibiotalar and subtalar effusions.    IMPRESSION  #1  Acute left lateral foot pain that started on 4/8/2024 concerning for midfoot sprain vs bifurcate ligament injury    PLAN  The following was discussed with the patient:     Mike Chase is a very pleasant 39 y.o. female with history of HLD, diabetes who is here for evaluation of left foot pain.  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  Additionally, she has some edema within the cuboid and evidence of chronic ATFL tear.  She has been able to wean out of the walking boot and has resolution of her lateral foot pain.  Now, she just has some mild pain on the plantar aspect of the foot.  She has been doing physical therapy.  She is encouraged to continue with her physical therapy.  She was encouraged to utilize shoes with better arch support or purchase over-the-counter orthotics.  Follow-up as needed    The patient was counseled to remain active, but avoid activities that worsen symptoms. The patient was in agreement with this plan. All questions were answered to the best of my ability.    PATIENT EDUCATION:  Education was discussed at today's appointment. A learning needs assessment was performed.    Primary learner: Mike Chase  Barriers to learning: None  Preferred language: English  Learning preferences include: Seeing and doing.  Discussed: Diagnosis and treatment plan.  Demonstrated: Understanding of material discussed.  Patient education materials given: None.  Learner response: Learner demonstrated understanding.    This note was dictated using Dragon speech recognition software and was not corrected for spelling or grammatical errors.    Patient seen and examined with PM&R resident, Dr. Rader.  History, physical examination, pertinent imaging findings and the plan of care were discussed and I performed the key portions of the history, physical examination, and discussion of the plan of care.      Marsha Magana MD    [facility]

## 2024-08-27 NOTE — PROGRESS NOTES
"Physical Therapy  Physical Therapy Treatment    Patient Name: Mike Chase  MRN: [id number]  Today's Date: 8/28/2024  Time Calculation  Start Time: 0805  Stop Time: 0845  Time Calculation (min): 40 min    Insurance:  Visit number: 4 of 6  Authorization info: Auth required  Insurance Type: [employer]  Cert start date: 8/7/24; Cert end date: 10/16/24      General:  Reason for visit:   Diagnosis   S92.026S (ICD-10-CM) - Closed nondisplaced fracture of anterior process of calcaneus, sequela   S93.312A (ICD-10-CM) - Cuboid syndrome of left foot       Assessment: Good tolerance to prescribed exercise. Notes mild ankle pain with BOSU lunges, however, this resolved quickly. Denies ankle/foot pain at end of session. Continues to report challenge with prescribed exercise. 2 more visits left on authorization. Pt to perform HEP independently for 4 weeks.       Plan: Progress ankle/foot strength and balance NV. Include hip strengthening. Consider subtalar glides to promote ankle mobility        Current Problem  1. Left ankle pain  Follow Up In Physical Therapy      2. Ankle stiffness, left  Follow Up In Physical Therapy              Precautions:   Precautions  STEADI Fall Risk Score (The score of 4 or more indicates an increased risk of falling): 3  Precautions Comment: DM, HTN    Subjective:  Subjective   Pt presents to PT without ankle pain, notes 3/10 lateral foot pain. States this is intermittent. Reports partial HEP adherence. Mellen tired after last visit, but no increase in pain.    Pain  Pain Assessment: 0-10  0-10 (Numeric) Pain Score: 3  Pain Location: Foot    Performing HEP?: Partially    Objective:  Objective   Challenged by SLS    Treatments:  Exercise:  - Nustep L2 5'  - Standing heel raise with ball (post tib) 2x15  - 2 way heel raise 2x10  - SLS (blue TB under 1st MTP) x60\" R/L  - Seated heel raise 8kg KB 2x15  - DBE 2' ea  - SLS air ex 2x30\" R/L  - BOSU lunge 2x15  - SS RTB (band at feet) x4 laps // bars  - SL ball " toss x15 R/L  - Squat to elevated table 2x15    HEP: Ankle ABC, windshield wipers, standing heel raise (post tib), toe yoga   Access Code: [email or web address]    Charges: 1 NMR, 2 TE    Aria Patino, PT

## 2024-08-28 ENCOUNTER — TREATMENT (OUTPATIENT)
Dept: PHYSICAL THERAPY | Facility: CLINIC | Age: 39
End: 2024-08-28
Payer: COMMERCIAL

## 2024-08-28 DIAGNOSIS — M25.572 LEFT ANKLE PAIN: ICD-10-CM

## 2024-08-28 DIAGNOSIS — M25.672 ANKLE STIFFNESS, LEFT: ICD-10-CM

## 2024-08-28 PROCEDURE — 97110 THERAPEUTIC EXERCISES: CPT | Mod: GP | Performed by: PHYSICAL THERAPIST

## 2024-08-28 PROCEDURE — 97112 NEUROMUSCULAR REEDUCATION: CPT | Mod: GP | Performed by: PHYSICAL THERAPIST

## 2024-08-28 ASSESSMENT — PAIN - FUNCTIONAL ASSESSMENT: PAIN_FUNCTIONAL_ASSESSMENT: 0-10

## 2024-08-28 ASSESSMENT — PAIN SCALES - GENERAL: PAINLEVEL_OUTOF10: 3

## 2024-09-18 DIAGNOSIS — Z78.9 MEASLES, MUMPS, RUBELLA (MMR) VACCINATION STATUS UNKNOWN: Primary | ICD-10-CM

## 2024-09-24 NOTE — PROGRESS NOTES
"Physical Therapy  Physical Therapy Treatment    Patient Name: Mike Chase  MRN: [id number]  Today's Date: 9/24/2024       Insurance:  Visit number: 5 of 6  Authorization info: Auth required  Insurance Type: Aaron  Cert start date: 8/7/24; Cert end date: 10/16/24      General:  Reason for visit:   Diagnosis   S92.026S (ICD-10-CM) - Closed nondisplaced fracture of anterior process of calcaneus, sequela   S93.312A (ICD-10-CM) - Cuboid syndrome of left foot       Assessment: Good tolerance to prescribed exercise. Notes mild ankle pain with BOSU lunges, however, this resolved quickly. Denies ankle/foot pain at end of session. Continues to report challenge with prescribed exercise. 2 more visits left on authorization. Pt to perform HEP independently for 4 weeks.       Plan: Progress ankle/foot strength and balance NV. Include hip strengthening. Consider subtalar glides to promote ankle mobility        Current Problem  No diagnosis found.          Precautions:        Subjective:  Subjective   Pt presents to PT without ankle pain, notes 3/10 lateral foot pain. States this is intermittent. Reports partial HEP adherence. [place] tired after last visit, but no increase in pain.    Pain       Performing HEP?: Partially    Objective:  Objective   Challenged by SLS    Treatments:  Exercise:  - Nustep L2 5'  - Standing heel raise with ball (post tib) 2x15  - 2 way heel raise 2x10  - SLS (blue TB under 1st MTP) x60\" R/L  - Seated heel raise 8kg KB 2x15  - DBE 2' ea  - SLS air ex 2x30\" R/L  - BOSU lunge 2x15  - SS RTB (band at feet) x4 laps // bars  - SL ball toss x15 R/L  - Squat to elevated table 2x15    HEP: Ankle ABC, windshield wipers, standing heel raise (post tib), toe yoga   Access Code: [email or web address]    Charges: 1 NMR, 2 TE    Aria Patino, PT  "

## 2024-09-25 ENCOUNTER — APPOINTMENT (OUTPATIENT)
Dept: PHYSICAL THERAPY | Facility: CLINIC | Age: 39
End: 2024-09-25
Payer: COMMERCIAL

## 2024-09-25 ENCOUNTER — DOCUMENTATION (OUTPATIENT)
Dept: PHYSICAL THERAPY | Facility: CLINIC | Age: 39
End: 2024-09-25
Payer: COMMERCIAL

## 2024-09-25 NOTE — PROGRESS NOTES
Therapy Communication Note    Patient Name: Mike Chase  MRN: [id number]  Department:   Room: Room/bed info not found  Today's Date: 9/25/2024     Discipline: Physical Therapy    Missed Visit Reason:  Son ill, had to  from school    Missed Time: Cancel    Comment: 1st CX of POC

## 2024-09-27 ENCOUNTER — LAB (OUTPATIENT)
Dept: LAB | Facility: LAB | Age: 39
End: 2024-09-27
Payer: COMMERCIAL

## 2024-09-27 DIAGNOSIS — Z78.9 MEASLES, MUMPS, RUBELLA (MMR) VACCINATION STATUS UNKNOWN: ICD-10-CM

## 2024-09-27 PROCEDURE — 86317 IMMUNOASSAY INFECTIOUS AGENT: CPT

## 2024-09-27 PROCEDURE — 86765 RUBEOLA ANTIBODY: CPT

## 2024-09-27 PROCEDURE — 36415 COLL VENOUS BLD VENIPUNCTURE: CPT

## 2024-09-27 PROCEDURE — 86735 MUMPS ANTIBODY: CPT

## 2024-09-28 LAB
MEV IGG SER QL IA: POSITIVE
MUMPS IGG ANTIBODY INDEX: 0.6 IA
MUV IGG SER IA-ACNC: NEGATIVE
RUBEOLA IGG ANTIBODY INDEX: 2.1 IA
RUBV IGG SERPL IA-ACNC: 0.7 IA
RUBV IGG SERPL QL IA: NEGATIVE

## 2024-10-01 NOTE — PROGRESS NOTES
Physical Therapy  Physical Therapy Orthopedic Discharge Note    Patient Name: Mike Chase  MRN: [id number]  Today's Date: 10/2/2024  Time Calculation  Start Time: 1400  Stop Time: 1436  Time Calculation (min): 36 min    Insurance:  Visit number: 5 of 6  Authorization info: Auth required  Insurance Type: [employer]  Cert start date: 8/7/24; Cert end date: 10/16/24      General:  Reason for visit:   Diagnosis   S92.026S (ICD-10-CM) - Closed nondisplaced fracture of anterior process of calcaneus, sequela   S93.312A (ICD-10-CM) - Cuboid syndrome of left foot       Assessment: Pt demonstrating some progress with PT POC. Rating progress as 50-60% with decrease in frequency of intense pain. Pt demonstrating slight improvements in inv/ev ROM, improved ankle/foot strength, improved SL balance, and improved exercise tolerance. Recommend DC to I HEP to continue to progress toward functional goals. If no change in symptoms or if worsening, recommend return to PT. Pt agreeable.       Plan: Updated 10/2/2024     Planned Interventions include: therapeutic exercise, self-care home management, manual therapy, therapeutic activities, gait training, neuromuscular coordination, vasopneumatic, dry needling, aquatic therapy    DC to I HEP    Current Problem  1. Left ankle pain  Follow Up In Physical Therapy      2. Ankle stiffness, left  Follow Up In Physical Therapy          Precautions:   Precautions  STEADI Fall Risk Score (The score of 4 or more indicates an increased risk of falling): 3  Precautions Comment: DM, HTN      Subjective:  Subjective   Patient reports improvement with POC. Notes decreased pain, improved balance and strength, and improved stability with walking. Reports poor HEP adherence d/t busy schedule.    Current Condition:   Better    Pain:  Pain Assessment: 0-10  0-10 (Numeric) Pain Score: 0 - No pain  Location: Plantar surface  of foot  Description: Tension   Aggravating Factors: weight bearing for an extended  "period of time  Relieving Factors: none  Pain Intensity:  0/10 - 6-7/10    Self Reported Function (0-100%) = 50-60%  - 100% being back to PLOF    Objective:  Objective   Palpation: tension, report of tightness through lateral plantar fascia     Ankle AROM (did not retest L)  DF R 14 deg; L 13 deg  PF R 53 deg; L 46 deg  Inv R 33 deg; L 41 deg  Ev R -2 deg (\"tight\"); L 8 deg (\"tight\")  Great toe ext R 72 deg (88 deg passively); L 67 deg (87 deg passively)     Ankle MMT  DF R 5/5; L 5/5  Inv R 5/5; L 5/5  Ev R 5/5; L 5/5  Great toe ext R 5/5; L 5/5  Great toe flex R 5/5; L 5/5  Toe ext R 4+/5; L 4+/5  Toe flex R 4+/5; L 4+/5     SL Heel Raise  R 14  L 10     SL Balance  - R 20+s  - L 20+s    Outcome Measures: Updated 10/2/2024  Other Measures  Lower Extremity Funtional Score (LEFS): 72/80     Patient Specific Functional Scale (0 = unable to perform; 10 = able to perform activity at same level as before injury or problem)  Activity Current Follow Up Discharge   Ines 7 year old 4  5     Exercise 4  5               Total score = sum of the activity scores/number of activities  Minimum detectable change (90%CI) for average score = 2 points  Minimum detectable change (90%CI) for single activity score = 3 points    EDUCATION: home exercise program, plan of care, activity modifications, pain management, and injury pathology       Goals: Updated 10/2/2024  Active       PT Problem       STG (Met)       Start:  08/07/24    Expected End:  09/21/24    Resolved:  10/02/24    Patient will demonstrate home exercise program adherence to supplement symptom reduction and functional gains made in clinic          LTG       Start:  08/07/24    Expected End:  11/05/24       Patient will verbalize pain (0-10) <3/10 at worst to improve ADL tolerance - NOT MET  Pt will demonstrate B ankle eversion AROM 10 degrees to improve joint mechanics during functional mobility - PROGRESSING  Pt will demonstrate gross ankle MMT 5/5 to assist in return " to PLOF - MET  Pt will demonstrate gross foot intrinsic MMT 4+/5 to improve functional stability with SL tasks - MET   Pt will perform 15 SL heel raises R/L without pain to improve ease with playing with 7 year old - PROGRESSING  Pt will demonstrate MCID (9 points) improvement on LEFS score to demonstrate improved functional ability - PROGRESSING  Pt will demonstrate MCID improvement on PSFS to demonstrate return to PLOF - PROGRESSING             Treatments:   Recheck performed, see objective measures  Updating and reviewing HEP    HEP: Ankle ABC, windshield wipers, standing heel raise (post tib), toe yoga   Access Code: [email or web address]     Charges: 1 NMR, 2 TE    Aria Patino, PT

## 2024-10-02 ENCOUNTER — TREATMENT (OUTPATIENT)
Dept: PHYSICAL THERAPY | Facility: CLINIC | Age: 39
End: 2024-10-02
Payer: COMMERCIAL

## 2024-10-02 DIAGNOSIS — M25.672 ANKLE STIFFNESS, LEFT: ICD-10-CM

## 2024-10-02 DIAGNOSIS — M25.572 LEFT ANKLE PAIN: ICD-10-CM

## 2024-10-02 PROCEDURE — 97110 THERAPEUTIC EXERCISES: CPT | Mod: GP | Performed by: PHYSICAL THERAPIST

## 2024-10-02 PROCEDURE — 97535 SELF CARE MNGMENT TRAINING: CPT | Mod: GP | Performed by: PHYSICAL THERAPIST

## 2024-10-02 ASSESSMENT — PAIN SCALES - GENERAL: PAINLEVEL_OUTOF10: 0 - NO PAIN

## 2024-10-02 ASSESSMENT — PAIN - FUNCTIONAL ASSESSMENT: PAIN_FUNCTIONAL_ASSESSMENT: 0-10

## 2024-10-30 ENCOUNTER — APPOINTMENT (OUTPATIENT)
Dept: INTEGRATIVE MEDICINE | Facility: CLINIC | Age: 39
End: 2024-10-30
Payer: COMMERCIAL

## 2024-10-30 DIAGNOSIS — M99.03 SEGMENTAL AND SOMATIC DYSFUNCTION OF LUMBAR REGION: ICD-10-CM

## 2024-10-30 DIAGNOSIS — M99.05 SEGMENTAL AND SOMATIC DYSFUNCTION OF PELVIC REGION: ICD-10-CM

## 2024-10-30 DIAGNOSIS — M54.50 CHRONIC BILATERAL LOW BACK PAIN WITHOUT SCIATICA: ICD-10-CM

## 2024-10-30 DIAGNOSIS — G89.29 CHRONIC BILATERAL LOW BACK PAIN WITHOUT SCIATICA: ICD-10-CM

## 2024-10-30 DIAGNOSIS — M54.2 CERVICALGIA: ICD-10-CM

## 2024-10-30 DIAGNOSIS — M99.02 SEGMENTAL AND SOMATIC DYSFUNCTION OF THORACIC REGION: Primary | ICD-10-CM

## 2024-10-30 PROCEDURE — 98941 CHIROPRACT MANJ 3-4 REGIONS: CPT | Performed by: CHIROPRACTOR

## 2024-11-06 ENCOUNTER — PATIENT MESSAGE (OUTPATIENT)
Dept: PRIMARY CARE | Facility: CLINIC | Age: 39
End: 2024-11-06
Payer: COMMERCIAL

## 2024-11-06 DIAGNOSIS — F41.9 ANXIETY: ICD-10-CM

## 2024-11-06 RX ORDER — HYDROXYZINE HYDROCHLORIDE 25 MG/1
25 TABLET, FILM COATED ORAL EVERY 8 HOURS PRN
Qty: 60 TABLET | Refills: 0 | Status: SHIPPED | OUTPATIENT
Start: 2024-11-06

## 2024-11-18 ENCOUNTER — APPOINTMENT (OUTPATIENT)
Dept: INTEGRATIVE MEDICINE | Facility: CLINIC | Age: 39
End: 2024-11-18
Payer: COMMERCIAL

## 2024-12-20 ENCOUNTER — HOSPITAL ENCOUNTER (EMERGENCY)
Facility: HOSPITAL | Age: 39
Discharge: HOME | End: 2024-12-20
Attending: EMERGENCY MEDICINE
Payer: COMMERCIAL

## 2024-12-20 ENCOUNTER — APPOINTMENT (OUTPATIENT)
Dept: RADIOLOGY | Facility: HOSPITAL | Age: 39
End: 2024-12-20
Payer: COMMERCIAL

## 2024-12-20 ENCOUNTER — APPOINTMENT (OUTPATIENT)
Dept: CARDIOLOGY | Facility: HOSPITAL | Age: 39
End: 2024-12-20
Payer: COMMERCIAL

## 2024-12-20 VITALS
HEART RATE: 81 BPM | WEIGHT: 293 LBS | DIASTOLIC BLOOD PRESSURE: 85 MMHG | OXYGEN SATURATION: 100 % | SYSTOLIC BLOOD PRESSURE: 127 MMHG | TEMPERATURE: 98.2 F | HEIGHT: 64 IN | RESPIRATION RATE: 18 BRPM | BODY MASS INDEX: 50.02 KG/M2

## 2024-12-20 DIAGNOSIS — R05.2 SUBACUTE COUGH: Primary | ICD-10-CM

## 2024-12-20 LAB
ALBUMIN SERPL BCP-MCNC: 3.5 G/DL (ref 3.4–5)
ALP SERPL-CCNC: 74 U/L (ref 33–110)
ALT SERPL W P-5'-P-CCNC: 12 U/L (ref 7–45)
ANION GAP SERPL CALC-SCNC: 9 MMOL/L (ref 10–20)
AST SERPL W P-5'-P-CCNC: 14 U/L (ref 9–39)
BASOPHILS # BLD AUTO: 0.06 X10*3/UL (ref 0–0.1)
BASOPHILS NFR BLD AUTO: 0.8 %
BILIRUB SERPL-MCNC: 0.3 MG/DL (ref 0–1.2)
BUN SERPL-MCNC: 12 MG/DL (ref 6–23)
CALCIUM SERPL-MCNC: 8.6 MG/DL (ref 8.6–10.3)
CARDIAC TROPONIN I PNL SERPL HS: <3 NG/L (ref 0–13)
CHLORIDE SERPL-SCNC: 108 MMOL/L (ref 98–107)
CO2 SERPL-SCNC: 25 MMOL/L (ref 21–32)
CREAT SERPL-MCNC: 0.87 MG/DL (ref 0.5–1.05)
EGFRCR SERPLBLD CKD-EPI 2021: 86 ML/MIN/1.73M*2
EOSINOPHIL # BLD AUTO: 0.27 X10*3/UL (ref 0–0.7)
EOSINOPHIL NFR BLD AUTO: 3.6 %
ERYTHROCYTE [DISTWIDTH] IN BLOOD BY AUTOMATED COUNT: 14.8 % (ref 11.5–14.5)
FLUAV RNA RESP QL NAA+PROBE: NOT DETECTED
FLUBV RNA RESP QL NAA+PROBE: NOT DETECTED
GLUCOSE SERPL-MCNC: 100 MG/DL (ref 74–99)
HCT VFR BLD AUTO: 38.2 % (ref 36–46)
HGB BLD-MCNC: 11.9 G/DL (ref 12–16)
IMM GRANULOCYTES # BLD AUTO: 0.02 X10*3/UL (ref 0–0.7)
IMM GRANULOCYTES NFR BLD AUTO: 0.3 % (ref 0–0.9)
LYMPHOCYTES # BLD AUTO: 2.54 X10*3/UL (ref 1.2–4.8)
LYMPHOCYTES NFR BLD AUTO: 33.6 %
MCH RBC QN AUTO: 26.5 PG (ref 26–34)
MCHC RBC AUTO-ENTMCNC: 31.2 G/DL (ref 32–36)
MCV RBC AUTO: 85 FL (ref 80–100)
MONOCYTES # BLD AUTO: 1.23 X10*3/UL (ref 0.1–1)
MONOCYTES NFR BLD AUTO: 16.2 %
NEUTROPHILS # BLD AUTO: 3.45 X10*3/UL (ref 1.2–7.7)
NEUTROPHILS NFR BLD AUTO: 45.5 %
NRBC BLD-RTO: 0 /100 WBCS (ref 0–0)
PLATELET # BLD AUTO: 348 X10*3/UL (ref 150–450)
POTASSIUM SERPL-SCNC: 4 MMOL/L (ref 3.5–5.3)
PROT SERPL-MCNC: 8.1 G/DL (ref 6.4–8.2)
RBC # BLD AUTO: 4.49 X10*6/UL (ref 4–5.2)
S PYO DNA THROAT QL NAA+PROBE: NOT DETECTED
SARS-COV-2 RNA RESP QL NAA+PROBE: NOT DETECTED
SODIUM SERPL-SCNC: 138 MMOL/L (ref 136–145)
WBC # BLD AUTO: 7.6 X10*3/UL (ref 4.4–11.3)

## 2024-12-20 PROCEDURE — 87651 STREP A DNA AMP PROBE: CPT | Performed by: EMERGENCY MEDICINE

## 2024-12-20 PROCEDURE — 87636 SARSCOV2 & INF A&B AMP PRB: CPT | Performed by: EMERGENCY MEDICINE

## 2024-12-20 PROCEDURE — 99285 EMERGENCY DEPT VISIT HI MDM: CPT | Mod: 25 | Performed by: EMERGENCY MEDICINE

## 2024-12-20 PROCEDURE — 84484 ASSAY OF TROPONIN QUANT: CPT | Performed by: PHYSICIAN ASSISTANT

## 2024-12-20 PROCEDURE — 71046 X-RAY EXAM CHEST 2 VIEWS: CPT | Performed by: RADIOLOGY

## 2024-12-20 PROCEDURE — 36415 COLL VENOUS BLD VENIPUNCTURE: CPT | Performed by: PHYSICIAN ASSISTANT

## 2024-12-20 PROCEDURE — 93005 ELECTROCARDIOGRAM TRACING: CPT

## 2024-12-20 PROCEDURE — 71046 X-RAY EXAM CHEST 2 VIEWS: CPT

## 2024-12-20 PROCEDURE — 80053 COMPREHEN METABOLIC PANEL: CPT | Performed by: PHYSICIAN ASSISTANT

## 2024-12-20 PROCEDURE — 85025 COMPLETE CBC W/AUTO DIFF WBC: CPT | Performed by: PHYSICIAN ASSISTANT

## 2024-12-20 RX ORDER — AZITHROMYCIN 250 MG/1
TABLET, FILM COATED ORAL
Qty: 6 TABLET | Refills: 0 | Status: SHIPPED | OUTPATIENT
Start: 2024-12-20

## 2024-12-20 ASSESSMENT — COLUMBIA-SUICIDE SEVERITY RATING SCALE - C-SSRS
2. HAVE YOU ACTUALLY HAD ANY THOUGHTS OF KILLING YOURSELF?: NO
1. IN THE PAST MONTH, HAVE YOU WISHED YOU WERE DEAD OR WISHED YOU COULD GO TO SLEEP AND NOT WAKE UP?: NO
6. HAVE YOU EVER DONE ANYTHING, STARTED TO DO ANYTHING, OR PREPARED TO DO ANYTHING TO END YOUR LIFE?: NO

## 2024-12-20 ASSESSMENT — PAIN SCALES - GENERAL: PAINLEVEL_OUTOF10: 5 - MODERATE PAIN

## 2024-12-20 ASSESSMENT — PAIN - FUNCTIONAL ASSESSMENT: PAIN_FUNCTIONAL_ASSESSMENT: 0-10

## 2024-12-20 NOTE — ED TRIAGE NOTES
Pt presented to ED from home with her daughter for increased cough and chest pain. Pt works in a school.  Denies dizziness, n/v/d, Her sx have been ongoing for almost 2 weeks.

## 2024-12-21 NOTE — DISCHARGE INSTRUCTIONS
You were seen in the emergency room today for evaluation of productive cough for 2 weeks.  Your symptoms are concerning for possible bronchitis.  Please take antibiotics as prescribed.  Please return to the emergency room if you have worsening symptoms include but not limited to difficulty breathing, chest pain, worsening symptoms despite treatment, or if you have any other concerns.

## 2024-12-21 NOTE — ED PROVIDER NOTES
History/Exam limitations: none.   Additional history was obtained from patient.          HPI:    Mike Chase is a 39 y.o. female PMH NIDDM, patient migraine, anxiety, present for evaluation of cough, chest discomfort.  Patient states that over the last few weeks has been having a cough is been productive of clearish yellowish-green sputum.  Has some chest discomfort right anterior chest that is worse with coughing.  Worse with kids and has been around numerous sick contacts in the last couple of weeks.  Has a headache intermittent with coughing and currently.  No pleuritic chest pain, positional component.  No significant shortness of breath.  No unilateral leg swelling, hemoptysis, coagulopathy history.  No abdominal pain, nausea, vomiting, diarrhea.          Physical Exam:  ED Triage Vitals [12/20/24 1803]   Temperature Heart Rate Respirations BP   36.9 °C (98.4 °F) 77 18 135/82      Pulse Ox Temp Source Heart Rate Source Patient Position   100 % Temporal Monitor Sitting      BP Location FiO2 (%)     Right arm --        GEN:      Alert, NAD  Eyes:       PERRL, EOMI  HENT:      NC/AT, OP clear, airway patent, MM  CV:      RRR, no MRG, no LE pitting edema, 2+ radial and pedal pulses  PULM:     CTAB, no w/r/r, easy WOB, symmetric chest rise  ABD:      Soft, NT, ND, no masses, BS +  :       No CVA TTP  NEURO:   A&Ox3, no focal deficits    MSK:      FROM, no joint deformities or swelling, no e/o trauma  SKIN:       Warm and dry  PSYCH:    Appropriate mood and affect         MDM/ED Course:   Mike Chase is a 39 y.o. female PMH NIDDM, patient migraine, anxiety, present for evaluation of cough, chest discomfort.  Vitals reassuring.  Exam as documented above.  Differential includes ACS, PNA, PE, aortic dissection/pathology, pneumothorax, pericardial effusion, pericarditis, myocarditis, GERD, musculoskeletal pain, pleurisy.  Unlikely aortic dissection given quality of symptoms, URI type symptoms, equal pulses, well  appearance, time course.  Considered PE, low risk Wells, PERC negative, unlikely.  Unlikely pericarditis given lack of positional component, no friction and pain EKG as below.  IV placed and labs drawn.  Troponin undetectably low, unlikely ACS given time course.  CBC and CMP reassuring.  Strep negative.  Flu COVID-negative.  Chest x-ray was obtained and no evidence of pneumonia, no evidence of pneumothorax, no other acute findings.  Patient is well-appearing, nontoxic-appearing on assessment.  Has had symptoms for 2 weeks, URI type symptoms with relatively productive cough.  Bacterial bronchitis or possible subtle atypical pneumonia is on the differential.  Given time course, will provide course of azithromycin/Z-Nicholas.  Patient feels comfortable with this plan.  Patient was explained findings, exam/study results, the importance of follow up and the plan moving forward; patient verbalized understanding and agreement. All questions were answered and no new questions at this time. Patient will be discharged with strict return precautions, follow up plan with PCP.    EKG reviewed by me: 5:59 PM normal sinus rhythm, rate 87, normal axis, normal intervals, no STEMI, no ectopy, no acute ischemic changes, T wave inversion in lead III unchanged from prior EKG, similar to prior EKGs.     Diagnoses as of 12/23/24 1201   Subacute cough         Chronic medical conditions affecting care listed in MDM. I independently reviewed imaging studies and agreed with radiology reads. I reviewed recent and relevant outside records including PCP notes, Prior discharge summaries, and prior radiology reports.    Procedure  Procedures    Diagnosis:   1.  Subacute cough  2.  Chest pain    Dispo: Discharged in stable condition      Disclaimer: Portions of this note were dictated by speech recognition. An attempt at proof reading was made to minimize errors. Minor errors in transcription may be present.  Please call if questions.     Buzz  MD Los  12/23/24 1329

## 2024-12-21 NOTE — ED TRIAGE NOTES
"TRIAGE NOTE   Secondary to high patient volumes and overcrowding, I saw the patient as the Clinician in Triage and performed a brief history and physical exam, established acuity, and ordered appropriate tests to develop basic plan of care. Once ED bed available, patient will subsequently be seen by an [facility], resident and/or physician who will independently evaluate the patient. Please see next provider's notes for further details and disposition.      Brief HPI:   Patient is 39-year-old female presents to the ED with daughter for evaluation of cough, congestion and chest pain.  She said cough has been present for few weeks.  Initially was productive of clear sputum now has thick green sputum.  Began to develop chest pain a few days ago.  It is worse with coughing and deep breathing.  Describes it as a tightness.  Localizes it to right anterior chest.  Does not radiate.  Denies history of asthma, anemia, bleeding or clotting disorder.  Says she has tried Mucinex but it is making it worse.  Last night she had a 5-minute coughing episode in the middle of the night that woke her from sleep.  Afterward she felt like she was going to throw up but had posttussive gagging and dry heaving without emesis.  Denies any fever, chills, palpitations, leg swelling, hemoptysis, abdominal pain.  Says she is very fatigued and feels like she got \"hit by a mat truck\".     Focused Physical exam:   Crackles left lower lobe.  Symmetric pulses.  Normal cap refill.    Plan/MDM:   Labs, troponin, chest x-ray    Please see subsequent provider note for further details and disposition     "

## 2024-12-23 LAB
ATRIAL RATE: 87 BPM
P AXIS: 58 DEGREES
P OFFSET: 188 MS
P ONSET: 140 MS
PR INTERVAL: 148 MS
Q ONSET: 214 MS
QRS COUNT: 14 BEATS
QRS DURATION: 78 MS
QT INTERVAL: 356 MS
QTC CALCULATION(BAZETT): 428 MS
QTC FREDERICIA: 402 MS
R AXIS: 10 DEGREES
T AXIS: 26 DEGREES
T OFFSET: 392 MS
VENTRICULAR RATE: 87 BPM

## 2024-12-31 ENCOUNTER — PROCEDURE VISIT (OUTPATIENT)
Dept: OBSTETRICS AND GYNECOLOGY | Facility: HOSPITAL | Age: 39
End: 2024-12-31
Payer: COMMERCIAL

## 2024-12-31 VITALS — WEIGHT: 293 LBS | DIASTOLIC BLOOD PRESSURE: 89 MMHG | SYSTOLIC BLOOD PRESSURE: 130 MMHG | BODY MASS INDEX: 58.36 KG/M2

## 2024-12-31 DIAGNOSIS — Z01.419 WELL WOMAN EXAM: Primary | ICD-10-CM

## 2024-12-31 DIAGNOSIS — Z12.31 ENCOUNTER FOR SCREENING MAMMOGRAM FOR MALIGNANT NEOPLASM OF BREAST: ICD-10-CM

## 2024-12-31 PROCEDURE — 90471 IMMUNIZATION ADMIN: CPT | Performed by: STUDENT IN AN ORGANIZED HEALTH CARE EDUCATION/TRAINING PROGRAM

## 2024-12-31 ASSESSMENT — ENCOUNTER SYMPTOMS
ENDOCRINE NEGATIVE: 0
TREMORS: 0
POLYDIPSIA: 0
HUNGER: 0
MUSCULOSKELETAL NEGATIVE: 0
SWEATS: 0
ALLERGIC/IMMUNOLOGIC NEGATIVE: 0
EYES NEGATIVE: 0
CARDIOVASCULAR NEGATIVE: 0
SPEECH DIFFICULTY: 0
DIZZINESS: 0
NERVOUS/ANXIOUS: 0
CONSTITUTIONAL NEGATIVE: 0
GASTROINTESTINAL NEGATIVE: 0
RESPIRATORY NEGATIVE: 0
NEUROLOGICAL NEGATIVE: 0
CONFUSION: 0
WEAKNESS: 0
SEIZURES: 0
FATIGUE: 0
PSYCHIATRIC NEGATIVE: 0
WEIGHT LOSS: 0
BLACKOUTS: 0
HEMATOLOGIC/LYMPHATIC NEGATIVE: 0
BLURRED VISION: 0
POLYPHAGIA: 0
HEADACHES: 0
VISUAL CHANGE: 0

## 2024-12-31 NOTE — PROGRESS NOTES
Patient here today for third and final injection of the HPV Vaccine (Gardasil)      Given in patient right deltoid, tolerated well.    Injection was supervised and approved by Dr. Ness Castillo.

## 2025-01-03 ENCOUNTER — APPOINTMENT (OUTPATIENT)
Dept: PRIMARY CARE | Facility: CLINIC | Age: 40
End: 2025-01-03
Payer: COMMERCIAL

## 2025-01-03 ENCOUNTER — LAB (OUTPATIENT)
Dept: LAB | Facility: LAB | Age: 40
End: 2025-01-03
Payer: COMMERCIAL

## 2025-01-03 VITALS
SYSTOLIC BLOOD PRESSURE: 116 MMHG | BODY MASS INDEX: 50.02 KG/M2 | OXYGEN SATURATION: 97 % | WEIGHT: 293 LBS | HEIGHT: 64 IN | HEART RATE: 68 BPM | DIASTOLIC BLOOD PRESSURE: 79 MMHG

## 2025-01-03 DIAGNOSIS — E55.9 VITAMIN D DEFICIENCY: ICD-10-CM

## 2025-01-03 DIAGNOSIS — E11.9 TYPE 2 DIABETES MELLITUS WITHOUT COMPLICATION, WITH LONG-TERM CURRENT USE OF INSULIN (MULTI): ICD-10-CM

## 2025-01-03 DIAGNOSIS — J40 BRONCHITIS: ICD-10-CM

## 2025-01-03 DIAGNOSIS — I10 BENIGN ESSENTIAL HYPERTENSION: ICD-10-CM

## 2025-01-03 DIAGNOSIS — F41.9 ANXIETY: ICD-10-CM

## 2025-01-03 DIAGNOSIS — Z79.4 TYPE 2 DIABETES MELLITUS WITHOUT COMPLICATION, WITH LONG-TERM CURRENT USE OF INSULIN (MULTI): Primary | ICD-10-CM

## 2025-01-03 DIAGNOSIS — G43.109 MIGRAINE WITH AURA AND WITHOUT STATUS MIGRAINOSUS, NOT INTRACTABLE: ICD-10-CM

## 2025-01-03 DIAGNOSIS — E11.9 TYPE 2 DIABETES MELLITUS WITHOUT COMPLICATION, WITH LONG-TERM CURRENT USE OF INSULIN (MULTI): Primary | ICD-10-CM

## 2025-01-03 DIAGNOSIS — Z79.4 TYPE 2 DIABETES MELLITUS WITHOUT COMPLICATION, WITH LONG-TERM CURRENT USE OF INSULIN (MULTI): ICD-10-CM

## 2025-01-03 LAB
25(OH)D3 SERPL-MCNC: 16 NG/ML (ref 30–100)
CHOLEST SERPL-MCNC: 141 MG/DL (ref 0–199)
CHOLESTEROL/HDL RATIO: 3.2
EST. AVERAGE GLUCOSE BLD GHB EST-MCNC: 123 MG/DL
HBA1C MFR BLD: 5.9 %
HDLC SERPL-MCNC: 44.4 MG/DL
LDLC SERPL CALC-MCNC: 87 MG/DL
NON HDL CHOLESTEROL: 97 MG/DL (ref 0–149)
TRIGL SERPL-MCNC: 47 MG/DL (ref 0–149)
TSH SERPL-ACNC: 2.02 MIU/L (ref 0.44–3.98)
VLDL: 9 MG/DL (ref 0–40)

## 2025-01-03 PROCEDURE — 82306 VITAMIN D 25 HYDROXY: CPT

## 2025-01-03 PROCEDURE — 99214 OFFICE O/P EST MOD 30 MIN: CPT | Performed by: NURSE PRACTITIONER

## 2025-01-03 PROCEDURE — 4010F ACE/ARB THERAPY RXD/TAKEN: CPT | Performed by: NURSE PRACTITIONER

## 2025-01-03 PROCEDURE — 3074F SYST BP LT 130 MM HG: CPT | Performed by: NURSE PRACTITIONER

## 2025-01-03 PROCEDURE — 3008F BODY MASS INDEX DOCD: CPT | Performed by: NURSE PRACTITIONER

## 2025-01-03 PROCEDURE — 1036F TOBACCO NON-USER: CPT | Performed by: NURSE PRACTITIONER

## 2025-01-03 PROCEDURE — 84443 ASSAY THYROID STIM HORMONE: CPT

## 2025-01-03 PROCEDURE — 80061 LIPID PANEL: CPT

## 2025-01-03 PROCEDURE — 83036 HEMOGLOBIN GLYCOSYLATED A1C: CPT

## 2025-01-03 PROCEDURE — 3078F DIAST BP <80 MM HG: CPT | Performed by: NURSE PRACTITIONER

## 2025-01-03 RX ORDER — IBUPROFEN 800 MG/1
800 TABLET ORAL EVERY 8 HOURS PRN
Qty: 90 TABLET | Refills: 0 | Status: SHIPPED | OUTPATIENT
Start: 2025-01-03

## 2025-01-03 RX ORDER — HYDROXYZINE HYDROCHLORIDE 25 MG/1
25 TABLET, FILM COATED ORAL EVERY 8 HOURS PRN
Qty: 60 TABLET | Refills: 0 | Status: SHIPPED | OUTPATIENT
Start: 2025-01-03

## 2025-01-03 RX ORDER — LOSARTAN POTASSIUM 25 MG/1
25 TABLET ORAL DAILY
Qty: 90 TABLET | Refills: 1 | Status: SHIPPED | OUTPATIENT
Start: 2025-01-03 | End: 2025-07-02

## 2025-01-03 RX ORDER — ATORVASTATIN CALCIUM 20 MG/1
20 TABLET, FILM COATED ORAL DAILY
Qty: 90 TABLET | Refills: 1 | Status: SHIPPED | OUTPATIENT
Start: 2025-01-03 | End: 2025-07-02

## 2025-01-03 RX ORDER — BENZOYL PEROXIDE 5 G/100G
GEL TOPICAL DAILY
COMMUNITY

## 2025-01-03 RX ORDER — CLINDAMYCIN PHOSPHATE 10 UG/ML
LOTION TOPICAL 2 TIMES DAILY
COMMUNITY

## 2025-01-03 RX ORDER — INSULIN GLARGINE 100 [IU]/ML
35 INJECTION, SOLUTION SUBCUTANEOUS NIGHTLY
Qty: 36 ML | Refills: 1 | Status: SHIPPED | OUTPATIENT
Start: 2025-01-03

## 2025-01-03 ASSESSMENT — ENCOUNTER SYMPTOMS
DEPRESSION: 0
LOSS OF SENSATION IN FEET: 0
OCCASIONAL FEELINGS OF UNSTEADINESS: 0

## 2025-01-03 ASSESSMENT — PATIENT HEALTH QUESTIONNAIRE - PHQ9
10. IF YOU CHECKED OFF ANY PROBLEMS, HOW DIFFICULT HAVE THESE PROBLEMS MADE IT FOR YOU TO DO YOUR WORK, TAKE CARE OF THINGS AT HOME, OR GET ALONG WITH OTHER PEOPLE: SOMEWHAT DIFFICULT
2. FEELING DOWN, DEPRESSED OR HOPELESS: SEVERAL DAYS
SUM OF ALL RESPONSES TO PHQ9 QUESTIONS 1 AND 2: 2
1. LITTLE INTEREST OR PLEASURE IN DOING THINGS: SEVERAL DAYS

## 2025-01-03 NOTE — PROGRESS NOTES
"Subjective   Patient ID: Mike Chase is a 40 y.o. female who presents for Follow-up (Diabetes and migraines that started a while ago but has been more consistent  ).    HPI     1. T2DM  Last HgA1c: 6.0% on 10/24/2023  Current meds: metformin 500 mg BID, basaglar 35 units QHS  Home glucose monitoring: occasionally, staying in target range 87%  Diet: fair   Exercise: active  Statin: atorvastatin 20 mg daily  ACEI: no, ARB losartan   ASA: none  Last urine albumin: 6/26/24  Diabetic foot exam: 6/15/22, declined   Vision exam: 2023  Dental cleaning: within the last couple months   Pneumovax: 2013     Denies polyuria, polydipsia, vision changes, yeast infections, or neuropathy.   Denies any episodes of hypoglycemia that she is aware of.     2. Hypertension  Current meds: losartan 25 mg every day   Home blood pressure monitoring: periodically   Diet: as above  Exercise: as above  Alcohol use: occasional  Tobacco use: none  Illicit drug use: none     Denies vision changes,  dizziness, chest pain, palpitations, or edema.     3. Migraines  Taking topiramate  Getting migraines 3 times per month   Doesn't take anything for these, used to have ubrelvy but wasn't covered and ran out of samples  Believes her triggers are stress  She gets an aura, light sensitivity, and occasional nausea  She has been getting some tension headaches more frequently over the last couple months, notices them more when she doesn't eat enough throughout the day     Review of Systems  ROS negative except as noted above in HPI.     Objective   /79   Pulse 68   Ht 1.626 m (5' 4\")   Wt (!) 151 kg (333 lb 9.6 oz)   SpO2 97%   BMI 57.26 kg/m²     Physical Exam  General: Alert and oriented, in no acute distress. Appears stated age, well-nourished, and well hydrated  HEENT:  - Head: Normocephalic and atraumatic   - Eyes: EOMI, PERRLA  - ENT: Hearing grossly intact  Heart: RRR. No murmurs, clicks, or rubs  Lungs: Unlabored breathing. CTAB with no " crackles, wheezes, or rhonchi  Abdomen: Normal BS in all 4 quadrants. Soft, non-tender, non-distended, with no masses  Extremities: Warm and well perfused. No edema. Normal peripheral pulses  Musculoskeletal: Normal gait and station  Neurological: Alert and oriented. No gross neurological deficits  Psychological: Appropriate mood and affect  Skin: No rash, abnormal lesions, cyanosis, or erythema    Assessment/Plan   1. T2DM  - Check HgA1c  - Continue metfomin 500 mg BID, basaglar 35 units at bedtime   - Monitor glucose more consistently with Freestyle Nia   - Continue atorvastatin for ASCVD risk   - Urine albumin up to date  - Declined foot exam  - Retinopathy screenings 1x/year and dental cleanings 2x/year     2. Hypertension  - Well controlled  - Continue losartan 25 mg every day  - Monitor BP at home      3. Migraines  - Getting 3 migraines per month   - Continue topiramate 50 mg BID   - Rx ubrelvy prn; sample also given in office  - Avoidance of known triggers     4. Tension headaches  - Encouraged to eat more throughout the day, she is also due for an eye exam and recommended getting her eyes checked    Check labs: lipid panel, TSH, vit D, and HgA1c as above    RTC in 4 months    THEODORE Kinney-CNP  [facility]

## 2025-01-04 DIAGNOSIS — E55.9 VITAMIN D DEFICIENCY: Primary | ICD-10-CM

## 2025-01-04 RX ORDER — ERGOCALCIFEROL 1.25 MG/1
50000 CAPSULE ORAL WEEKLY
Qty: 12 CAPSULE | Refills: 0 | Status: SHIPPED | OUTPATIENT
Start: 2025-01-04 | End: 2025-03-29

## 2025-01-14 DIAGNOSIS — R51.9 CHRONIC DAILY HEADACHE: ICD-10-CM

## 2025-01-14 DIAGNOSIS — G43.109 MIGRAINE WITH AURA AND WITHOUT STATUS MIGRAINOSUS, NOT INTRACTABLE: ICD-10-CM

## 2025-01-14 RX ORDER — TOPIRAMATE 50 MG/1
50 TABLET, FILM COATED ORAL 2 TIMES DAILY
Qty: 180 TABLET | Refills: 1 | Status: SHIPPED | OUTPATIENT
Start: 2025-01-14 | End: 2025-07-13

## 2025-02-01 ENCOUNTER — APPOINTMENT (OUTPATIENT)
Dept: RADIOLOGY | Facility: CLINIC | Age: 40
End: 2025-02-01
Payer: COMMERCIAL

## 2025-02-03 ENCOUNTER — HOSPITAL ENCOUNTER (OUTPATIENT)
Dept: RADIOLOGY | Facility: CLINIC | Age: 40
Discharge: HOME | End: 2025-02-03
Payer: COMMERCIAL

## 2025-02-03 DIAGNOSIS — Z12.31 ENCOUNTER FOR SCREENING MAMMOGRAM FOR MALIGNANT NEOPLASM OF BREAST: ICD-10-CM

## 2025-02-03 DIAGNOSIS — Z01.419 WELL WOMAN EXAM: ICD-10-CM

## 2025-02-03 PROCEDURE — 77063 BREAST TOMOSYNTHESIS BI: CPT | Performed by: RADIOLOGY

## 2025-02-03 PROCEDURE — 77063 BREAST TOMOSYNTHESIS BI: CPT

## 2025-02-03 PROCEDURE — 77067 SCR MAMMO BI INCL CAD: CPT | Performed by: RADIOLOGY

## 2025-02-10 ENCOUNTER — PATIENT MESSAGE (OUTPATIENT)
Dept: OBSTETRICS AND GYNECOLOGY | Facility: HOSPITAL | Age: 40
End: 2025-02-10
Payer: COMMERCIAL

## 2025-02-10 DIAGNOSIS — Z91.89 INCREASED RISK OF BREAST CANCER: Primary | ICD-10-CM

## 2025-04-15 ENCOUNTER — HOSPITAL ENCOUNTER (OUTPATIENT)
Dept: RADIOLOGY | Facility: HOSPITAL | Age: 40
Discharge: HOME | End: 2025-04-15
Payer: COMMERCIAL

## 2025-04-15 ENCOUNTER — OFFICE VISIT (OUTPATIENT)
Dept: ORTHOPEDIC SURGERY | Facility: HOSPITAL | Age: 40
End: 2025-04-15
Payer: COMMERCIAL

## 2025-04-15 VITALS — WEIGHT: 293 LBS | HEIGHT: 64 IN | BODY MASS INDEX: 50.02 KG/M2

## 2025-04-15 DIAGNOSIS — M19.072 ARTHRITIS OF LEFT MIDFOOT: ICD-10-CM

## 2025-04-15 DIAGNOSIS — M79.672 LEFT FOOT PAIN: ICD-10-CM

## 2025-04-15 DIAGNOSIS — M84.375A METATARSAL STRESS FRACTURE OF LEFT FOOT, INITIAL ENCOUNTER: Primary | ICD-10-CM

## 2025-04-15 PROCEDURE — L4361 PNEUMA/VAC WALK BOOT PRE OTS: HCPCS | Performed by: STUDENT IN AN ORGANIZED HEALTH CARE EDUCATION/TRAINING PROGRAM

## 2025-04-15 PROCEDURE — 3044F HG A1C LEVEL LT 7.0%: CPT | Performed by: STUDENT IN AN ORGANIZED HEALTH CARE EDUCATION/TRAINING PROGRAM

## 2025-04-15 PROCEDURE — 3008F BODY MASS INDEX DOCD: CPT | Performed by: STUDENT IN AN ORGANIZED HEALTH CARE EDUCATION/TRAINING PROGRAM

## 2025-04-15 PROCEDURE — 3048F LDL-C <100 MG/DL: CPT | Performed by: STUDENT IN AN ORGANIZED HEALTH CARE EDUCATION/TRAINING PROGRAM

## 2025-04-15 PROCEDURE — 99214 OFFICE O/P EST MOD 30 MIN: CPT | Performed by: STUDENT IN AN ORGANIZED HEALTH CARE EDUCATION/TRAINING PROGRAM

## 2025-04-15 PROCEDURE — 73630 X-RAY EXAM OF FOOT: CPT | Mod: LEFT SIDE | Performed by: RADIOLOGY

## 2025-04-15 PROCEDURE — 73630 X-RAY EXAM OF FOOT: CPT | Mod: LT

## 2025-04-15 PROCEDURE — E0114 CRUTCH UNDERARM PAIR NO WOOD: HCPCS | Performed by: STUDENT IN AN ORGANIZED HEALTH CARE EDUCATION/TRAINING PROGRAM

## 2025-04-15 PROCEDURE — 4010F ACE/ARB THERAPY RXD/TAKEN: CPT | Performed by: STUDENT IN AN ORGANIZED HEALTH CARE EDUCATION/TRAINING PROGRAM

## 2025-04-15 RX ORDER — MELOXICAM 15 MG/1
15 TABLET ORAL DAILY PRN
Qty: 30 TABLET | Refills: 0 | Status: SHIPPED | OUTPATIENT
Start: 2025-04-15 | End: 2025-05-15

## 2025-04-15 ASSESSMENT — PAIN - FUNCTIONAL ASSESSMENT: PAIN_FUNCTIONAL_ASSESSMENT: 0-10

## 2025-04-15 ASSESSMENT — PAIN SCALES - GENERAL: PAINLEVEL_OUTOF10: 8

## 2025-04-15 ASSESSMENT — PAIN DESCRIPTION - DESCRIPTORS: DESCRIPTORS: THROBBING

## 2025-04-15 NOTE — PROGRESS NOTES
Sports Medicine Injury Clinic Note    Today's Date:  04/15/2025     HPI: Mike Chase is a 40 y.o. female with history of NIDDM, nondisplaced left calcaneus anterior process fracture (April 2023) who presents today in orthopedic injury clinic for evaluation of chronic left foot pain.  She previously followed with Dr. Magana for management of nondisplaced anterior process of left calcaneus fracture and was placed in walking boot, transitioned out of boot, and followed with physical therapy at that time.  States she did have improvement of her pain at that time, completed course of physical therapy, and continued with home exercises.  States that her pain returned within 1 to 2 months after finishing PT and feels it has been progressively worsening over the last several months.  States she rolled her left ankle 10/31/2024 and subsequently had pain/swelling of the left ankle and midfoot which she treated with rest, ice, compression, elevation and had slight improvement at that time.  States she has had pain across the top of her foot since then that is worse with ambulation and has been progressively worsening.  States she has mild chronic swelling in this area without any bruising, redness, warmth, radiation of pain, numbness, tingling, or weakness.  She has tried OTC anti-inflammatory/analgesics, rest, ice, elevation, and compression without significant improvement.  Has OTC shoe insoles which do provide some relief.    She has no other complaints.    Physical Examination:     SKIN: No increased erythema, warmth, rashes, or concerning skin lesions  NEURO: Sensation is intact in the bilateral lower extremities. Strength is grossly 5 out of 5 throughout the bilateral lower extremities, unless noted below.  GAIT: Antalgic, favoring RLE  MUSCULOSKELETAL: Examination of the left foot: Ankle range of motion is full and slightly painful in inversion/eversion.  There is mild soft tissue swelling over anterolateral aspect  of the left ankle and dorsum of midfoot.  No obvious ecchymosis.  Strength of the ankle and foot is slightly reduced and painful with resisted eversion.  Tenderness to palpation over dorsal aspect of the tarsometatarsal region (particularly near metatarsal bases 2-5). Mild tenderness over metatarsal shafts 2-5 and intermetatarsal spaces. No tenderness to palpation over the Achilles tendon, plantar fascia, calcaneus, navicular, metatarsal heads, 1st MTP joint. Negative calcaneal squeeze. No increased laxity with stress of Lis Franc ligament complex. Metatarsal squeeze is positive. No Rima click.  Increased pain with weightbearing and toe/heel stance.  Unable to perform hop test due to pain.    Imaging:  Radiographs of the left foot obtained today were reviewed and revealed degenerative changes of the ankle joint, tarsal region, and tarsometatarsal region without definitive evidence of acute osseous abnormalities.    The studies were reviewed by me personally in the office today.    Problem List Items Addressed This Visit    None  Visit Diagnoses         Codes      Metatarsal stress fracture of left foot, initial encounter    -  Primary M84.375A    Relevant Orders    Walking Boot Tall    Crutches    MR foot left wo IV contrast      Left foot pain     M79.672    Relevant Medications    meloxicam (Mobic) 15 mg tablet    Other Relevant Orders    XR foot left 3+ views      Arthritis of left midfoot     M19.072          Assessment and Plan:    We reviewed the exam and x-ray findings and discussed the conservative and surgical treatment options. We agreed to obtain MRI left foot for evaluation of metatarsal stress fracture in setting of continued chronic left foot pain for 6 months without significant improvement with conservative treatments.  Patient was placed in tall walking boot and provided crutches with recommendation for weightbearing as tolerated in walking boot, but may continue use of crutches as needed until  MRI is resulted.  Recommended prescription doses of Tylenol and encouraged use of ice, compression, elevation as needed for acute flares of pain.  Prescribed short course of meloxicam to be taken once daily with food as needed for acute flares of pain and instructed patient to avoid other NSAIDs when taking this medication.  Plan for follow-up in clinic when MRI is resulted, otherwise may follow-up sooner if any new concerns arise.  Discussed this plan with the patient who is understanding and agreeable.    Patient was prescribed a tall walking boot for left foot tarsometatarsal stress injury and degenerative joint changes. The patient is ambulatory with or without aid; but, has weakness, instability and/or deformity of their left foot which requires stabilization from this orthosis to improve their function.      Verbal and written instructions for the use, wear schedule, cleaning and application of this item were given.  Patient was instructed that should the brace result in increased pain, decreased sensation, increased swelling, or an overall worsening of their medical condition, to please contact our office immediately.     Orthotic management and training was provided for skin care, modifications due to healing tissues, edema changes, interruption in skin integrity, and safety precautions with the orthosis.     **This note was dictated using Dragon speech recognition software and was not corrected for spelling or grammatical errors**.    Walker Woods DO  Primary Care Sports Medicine  [facility] [place]

## 2025-04-22 DIAGNOSIS — J30.9 ALLERGIC RHINITIS, UNSPECIFIED SEASONALITY, UNSPECIFIED TRIGGER: ICD-10-CM

## 2025-04-22 RX ORDER — CETIRIZINE HYDROCHLORIDE 10 MG/1
10 TABLET ORAL DAILY
Qty: 90 TABLET | Refills: 0 | Status: SHIPPED | OUTPATIENT
Start: 2025-04-22 | End: 2025-07-21

## 2025-04-29 ASSESSMENT — ENCOUNTER SYMPTOMS
HEADACHES: 0
SWEATS: 0
SPEECH DIFFICULTY: 0
BLURRED VISION: 0
CONFUSION: 0
DIZZINESS: 0
WEIGHT LOSS: 0
VISUAL CHANGE: 0
HUNGER: 0
TREMORS: 0
WEAKNESS: 0
NERVOUS/ANXIOUS: 0
POLYDIPSIA: 0
POLYPHAGIA: 0
SEIZURES: 0
BLACKOUTS: 0
FATIGUE: 0

## 2025-04-30 ENCOUNTER — HOSPITAL ENCOUNTER (OUTPATIENT)
Dept: RADIOLOGY | Facility: CLINIC | Age: 40
Discharge: HOME | End: 2025-04-30
Payer: COMMERCIAL

## 2025-04-30 DIAGNOSIS — M84.375A METATARSAL STRESS FRACTURE OF LEFT FOOT, INITIAL ENCOUNTER: ICD-10-CM

## 2025-04-30 PROCEDURE — 73718 MRI LOWER EXTREMITY W/O DYE: CPT | Mod: LT

## 2025-05-05 ENCOUNTER — APPOINTMENT (OUTPATIENT)
Dept: PRIMARY CARE | Facility: CLINIC | Age: 40
End: 2025-05-05
Payer: COMMERCIAL

## 2025-05-06 ENCOUNTER — TELEMEDICINE (OUTPATIENT)
Dept: ORTHOPEDIC SURGERY | Facility: HOSPITAL | Age: 40
End: 2025-05-06
Payer: COMMERCIAL

## 2025-05-06 DIAGNOSIS — M84.375A METATARSAL STRESS FRACTURE OF LEFT FOOT, INITIAL ENCOUNTER: ICD-10-CM

## 2025-05-06 PROCEDURE — 4010F ACE/ARB THERAPY RXD/TAKEN: CPT | Performed by: STUDENT IN AN ORGANIZED HEALTH CARE EDUCATION/TRAINING PROGRAM

## 2025-05-06 PROCEDURE — 3048F LDL-C <100 MG/DL: CPT | Performed by: STUDENT IN AN ORGANIZED HEALTH CARE EDUCATION/TRAINING PROGRAM

## 2025-05-06 PROCEDURE — 3044F HG A1C LEVEL LT 7.0%: CPT | Performed by: STUDENT IN AN ORGANIZED HEALTH CARE EDUCATION/TRAINING PROGRAM

## 2025-05-06 PROCEDURE — 99214 OFFICE O/P EST MOD 30 MIN: CPT | Performed by: STUDENT IN AN ORGANIZED HEALTH CARE EDUCATION/TRAINING PROGRAM

## 2025-05-06 NOTE — PROGRESS NOTES
Sports Medicine Office Note    Today's Date:  05/06/2025     HPI: Mike Chase is a 40 y.o. *** who presents today for ***    ***    *** has no other complaints.    Physical Examination:     ***    Imaging:  Radiographs of the *** were reviewed and revealed ***.    The studies were reviewed by me personally in the office today.    === 04/15/25 ===    XR FOOT 3+ VIEWS LEFT    - Impression -  No acute osseous abnormality.    Signed by: Vlad Solo 4/16/2025 4:39 PM  Dictation workstation:   [id number]    Problem List Items Addressed This Visit    None    Assessment and Plan:    We reviewed the exam and imaging findings and discussed the conservative and surgical treatment options. We agreed ***. Physical therapy referral *** provided and discussed the importance of regular home exercises.  Recommended prescription doses of Tylenol and encouraged use of ice or heat as needed for acute flares of pain.  Plan for follow-up in *** for re-evaluation, otherwise may follow-up sooner if any new concerns arise.  Discussed this plan with the patient who is understanding and agreeable.    **This note was dictated using Dragon speech recognition software and was not corrected for spelling or grammatical errors**.    Walker Woods DO  Primary Care Sports Medicine  [facility] [place]    has had improvement of her pain, tolerating weightbearing in tall walking boot.  Denies any interval injury/trauma or any new or worsening swelling, redness, warmth, bruising, radiation of pain, numbness, tingling, weakness.     She has no other complaints.     Physical Examination:      Not performed due to virtual visit.     Imaging:  Radiographs of the left foot obtained 4/15/2025 were reviewed and revealed degenerative changes of the ankle joint, tarsal region, and tarsometatarsal region without definitive evidence of acute osseous abnormalities.    MRI left foot obtained 4/30/2025 was reviewed and revealed stress reaction of the fourth metatarsal and fifth metatarsal bones without definitive fracture line, moderate fourth TMT osteoarthrosis.     The studies were reviewed by me personally in the office today.    Problem List Items Addressed This Visit    None  Visit Diagnoses         Codes      Metatarsal stress fracture of left foot, initial encounter     M84.375A    Relevant Orders    Ferritin (Completed)    Vitamin D 25-Hydroxy,Total (for eval of Vitamin D levels) (Completed)    Calcium, Ionized (Completed)    Iron and TIBC (Completed)          Assessment and Plan:    We reviewed the exam and imaging findings and discussed the conservative and surgical treatment options. We agreed to continue with conservative management of left 4th/5th metatarsal stress reaction/fracture as she seems to be doing well with current treatment plan.  Will obtain labs to evaluate for underlying metabolic predisposing factors for stress reaction including vitamin D level, calcium level, iron panel.  Will follow-up when labs are resulted if any abnormalities identified with plan to treat/supplement accordingly.  She may continue weightbearing in tall walking boot as tolerated, but should come out of boot when nonweightbearing for ankle range of motion/stretching with light resistance as tolerated.  May start weaning out of boot  within the next 2-3 weeks as tolerated, but encouraged to use boot with weightbearing if any worsening of pain.  Encouraged use of wide toed, comfortable, closed toed footwear with weightbearing if able to wean out of boot without worsening/aggravation of pain.  Recommended prescription doses of Tylenol and encouraged use of ice and elevation as needed for acute flares of pain.  Plan for follow-up in 4 weeks in clinic for re-evaluation, otherwise may follow-up sooner if any new concerns arise.  Discussed this plan with the patient who is understanding and agreeable.    **This note was dictated using Dragon speech recognition software and was not corrected for spelling or grammatical errors**.    Walker Woods DO  [facility]  [facility] [place]

## 2025-05-08 ENCOUNTER — APPOINTMENT (OUTPATIENT)
Dept: PRIMARY CARE | Facility: CLINIC | Age: 40
End: 2025-05-08
Payer: COMMERCIAL

## 2025-05-08 VITALS
OXYGEN SATURATION: 97 % | DIASTOLIC BLOOD PRESSURE: 82 MMHG | RESPIRATION RATE: 17 BRPM | BODY MASS INDEX: 58.67 KG/M2 | WEIGHT: 293 LBS | SYSTOLIC BLOOD PRESSURE: 120 MMHG | HEART RATE: 75 BPM

## 2025-05-08 DIAGNOSIS — R51.9 CHRONIC DAILY HEADACHE: ICD-10-CM

## 2025-05-08 DIAGNOSIS — E11.9 TYPE 2 DIABETES MELLITUS WITHOUT COMPLICATION, WITH LONG-TERM CURRENT USE OF INSULIN: Primary | ICD-10-CM

## 2025-05-08 DIAGNOSIS — Z79.4 TYPE 2 DIABETES MELLITUS WITHOUT COMPLICATION, WITH LONG-TERM CURRENT USE OF INSULIN: Primary | ICD-10-CM

## 2025-05-08 DIAGNOSIS — E66.812 CLASS 2 OBESITY WITHOUT SERIOUS COMORBIDITY WITH BODY MASS INDEX (BMI) OF 35.0 TO 35.9 IN ADULT, UNSPECIFIED OBESITY TYPE: ICD-10-CM

## 2025-05-08 DIAGNOSIS — E11.9 TYPE 2 DIABETES MELLITUS WITHOUT COMPLICATION, WITHOUT LONG-TERM CURRENT USE OF INSULIN: ICD-10-CM

## 2025-05-08 DIAGNOSIS — I10 BENIGN ESSENTIAL HYPERTENSION: ICD-10-CM

## 2025-05-08 DIAGNOSIS — G43.109 MIGRAINE WITH AURA AND WITHOUT STATUS MIGRAINOSUS, NOT INTRACTABLE: ICD-10-CM

## 2025-05-08 DIAGNOSIS — J30.9 ALLERGIC RHINITIS, UNSPECIFIED SEASONALITY, UNSPECIFIED TRIGGER: ICD-10-CM

## 2025-05-08 LAB — POC HEMOGLOBIN A1C: 5.9 % (ref 4.2–6.5)

## 2025-05-08 PROCEDURE — 4010F ACE/ARB THERAPY RXD/TAKEN: CPT | Performed by: NURSE PRACTITIONER

## 2025-05-08 PROCEDURE — 3074F SYST BP LT 130 MM HG: CPT | Performed by: NURSE PRACTITIONER

## 2025-05-08 PROCEDURE — 3079F DIAST BP 80-89 MM HG: CPT | Performed by: NURSE PRACTITIONER

## 2025-05-08 PROCEDURE — 3048F LDL-C <100 MG/DL: CPT | Performed by: NURSE PRACTITIONER

## 2025-05-08 PROCEDURE — 3044F HG A1C LEVEL LT 7.0%: CPT | Performed by: NURSE PRACTITIONER

## 2025-05-08 PROCEDURE — 83036 HEMOGLOBIN GLYCOSYLATED A1C: CPT | Performed by: NURSE PRACTITIONER

## 2025-05-08 PROCEDURE — 1036F TOBACCO NON-USER: CPT | Performed by: NURSE PRACTITIONER

## 2025-05-08 PROCEDURE — 99214 OFFICE O/P EST MOD 30 MIN: CPT | Performed by: NURSE PRACTITIONER

## 2025-05-08 RX ORDER — CETIRIZINE HYDROCHLORIDE 10 MG/1
10 TABLET ORAL DAILY
Qty: 90 TABLET | Refills: 1 | Status: SHIPPED | OUTPATIENT
Start: 2025-05-08 | End: 2025-11-04

## 2025-05-08 RX ORDER — FLUTICASONE PROPIONATE 50 MCG
1 SPRAY, SUSPENSION (ML) NASAL DAILY
Qty: 16 G | Refills: 2 | Status: SHIPPED | OUTPATIENT
Start: 2025-05-08 | End: 2026-05-08

## 2025-05-08 RX ORDER — BLOOD SUGAR DIAGNOSTIC
STRIP MISCELLANEOUS
Qty: 200 STRIP | Refills: 3 | Status: SHIPPED | OUTPATIENT
Start: 2025-05-08

## 2025-05-08 RX ORDER — TOPIRAMATE 50 MG/1
50 TABLET, FILM COATED ORAL 2 TIMES DAILY
Qty: 180 TABLET | Refills: 1 | Status: SHIPPED | OUTPATIENT
Start: 2025-05-08 | End: 2025-11-04

## 2025-05-08 RX ORDER — LOSARTAN POTASSIUM 25 MG/1
25 TABLET ORAL DAILY
Qty: 90 TABLET | Refills: 1 | Status: SHIPPED | OUTPATIENT
Start: 2025-05-08 | End: 2025-11-04

## 2025-05-08 RX ORDER — IBUPROFEN 800 MG/1
800 TABLET ORAL EVERY 8 HOURS PRN
Qty: 90 TABLET | Refills: 0 | Status: SHIPPED | OUTPATIENT
Start: 2025-05-08

## 2025-05-08 RX ORDER — ATORVASTATIN CALCIUM 20 MG/1
20 TABLET, FILM COATED ORAL DAILY
Qty: 90 TABLET | Refills: 1 | Status: SHIPPED | OUTPATIENT
Start: 2025-05-08 | End: 2025-11-04

## 2025-05-08 RX ORDER — INSULIN GLARGINE 100 [IU]/ML
32 INJECTION, SOLUTION SUBCUTANEOUS NIGHTLY
Qty: 27 ML | Refills: 1 | Status: SHIPPED | OUTPATIENT
Start: 2025-05-08 | End: 2025-10-24

## 2025-05-08 RX ORDER — METFORMIN HYDROCHLORIDE 500 MG/1
500 TABLET ORAL 2 TIMES DAILY
Qty: 180 TABLET | Refills: 3 | Status: SHIPPED | OUTPATIENT
Start: 2025-05-08 | End: 2026-05-03

## 2025-05-08 RX ORDER — PEN NEEDLE, DIABETIC 30 GX3/16"
1 NEEDLE, DISPOSABLE MISCELLANEOUS DAILY
Qty: 90 EACH | Refills: 3 | Status: SHIPPED | OUTPATIENT
Start: 2025-05-08

## 2025-05-08 ASSESSMENT — ENCOUNTER SYMPTOMS
BLURRED VISION: 0
SEIZURES: 0
HUNGER: 0
WEIGHT LOSS: 0
TREMORS: 0
SWEATS: 0
VISUAL CHANGE: 0
SPEECH DIFFICULTY: 0
DIZZINESS: 0
POLYDIPSIA: 0
POLYPHAGIA: 0
WEAKNESS: 0
HEADACHES: 0
BLACKOUTS: 0
NERVOUS/ANXIOUS: 0
CONFUSION: 0
FATIGUE: 0

## 2025-05-08 ASSESSMENT — PATIENT HEALTH QUESTIONNAIRE - PHQ9
2. FEELING DOWN, DEPRESSED OR HOPELESS: NOT AT ALL
SUM OF ALL RESPONSES TO PHQ9 QUESTIONS 1 AND 2: 0
1. LITTLE INTEREST OR PLEASURE IN DOING THINGS: NOT AT ALL

## 2025-05-08 NOTE — PROGRESS NOTES
Subjective   Patient ID: Mike Chase is a 40 y.o. female who presents for Diabetes (No concerns- Refills needed for Topomax, atorvastatin, metformin, losartan, insulin, zytrec, flonase, pen needles, accucheck test strips- possibly sensors unsure if that was sent in ).    HPI     T2DM  Last HgA1c: 5.9% on 1/3/2025  Current meds: metformin 500 mg BID, basaglar 35 units QHS  Home glucose monitoring: average in the last week is 89; getting lows in to the 60s at night   Diet: fair   Exercise: none  Statin: atorvastatin 20 mg daily  ACEI: no, ARB losartan   ASA: none  Last urine albumin: 6/26/24  Diabetic foot exam: 6/15/22, declined   Vision exam: 2025   Dental cleaning: earlier this year   Pneumovax: 2013     Denies polyuria, polydipsia, vision changes, yeast infections, or neuropathy     Hypertension  Current meds: losartan 25 mg every day   Home blood pressure monitoring: none   Diet: as above  Exercise: as above  Alcohol use: occasional  Tobacco use: none  Illicit drug use: none     Denies vision changes,  dizziness, chest pain, palpitations, or edema.     Migraines  Taking topiramate  Getting migraines 1-2 times per month   Does not take anything for them  Believes her triggers are stress  She gets an aura, light sensitivity, and occasional nausea    Seasonal allergies  Would like some allergy testing   Reports nasal congestion and itching, watery eyes  Taking Zyrtec but doesn't help much   Zaditor eye drops don't work so she tried clear eyes but those didn't work either     Review of Systems  ROS negative except as noted above in HPI.     Objective   /82 (BP Location: Left arm, Patient Position: Sitting, BP Cuff Size: Large adult)   Pulse 75   Resp 17   Wt (!) 155 kg (341 lb 12.8 oz)   LMP  (LMP Unknown)   SpO2 97%   BMI 58.67 kg/m²     Physical Exam  General: Alert and oriented, in no acute distress. Appears stated age, well-nourished, and well hydrated  HEENT:  - Head: Normocephalic and  atraumatic   - Eyes: EOMI, PERRLA  - ENT: Hearing grossly intact  Heart: RRR. No murmurs, clicks, or rubs  Lungs: Unlabored breathing. CTAB with no crackles, wheezes, or rhonchi  Abdomen: Normal BS in all 4 quadrants. Soft, non-tender, non-distended, with no masses  Extremities: Warm and well perfused. No edema. Normal peripheral pulses  Musculoskeletal: Normal gait and station  Neurological: Alert and oriented. No gross neurological deficits  Psychological: Appropriate mood and affect  Skin: No rash, abnormal lesions, cyanosis, or erythema    Assessment/Plan   Diagnoses and all orders for this visit:  Type 2 diabetes mellitus without complication, with long-term current use of insulin  -     POCT glycosylated hemoglobin (Hb A1C) manually resulted: 5.9%  -     atorvastatin (Lipitor) 20 mg tablet; Take 1 tablet (20 mg) by mouth once daily.  -     Decrease Basaglar: insulin glargine (Basaglar KwikPen U-100 Insulin) 100 unit/mL (3 mL) pen; Inject 32 Units under the skin once daily at bedtime.  -     metFORMIN (Glucophage) 500 mg tablet; Take 1 tablet (500 mg) by mouth 2 times a day. Take with food.  -     blood sugar diagnostic (Accu-Chek Guide test strips); Use to check blood sugar 4 times daily  -      Continue to monitor glucose with CGM  -      Retinopathy exam done within the last couple months  -      Dental cleanings 2x/year  -      Urine albumin up to date(6/26/24)  Allergic rhinitis, unspecified seasonality, unspecified trigger  -     Respiratory Allergy Profile IgE; Future  -     cetirizine (ZyrTEC) 10 mg tablet; Take 1 tablet (10 mg) by mouth once daily.  -     fluticasone (Flonase) 50 mcg/actuation nasal spray; Administer 1 spray into each nostril once daily. Shake gently. Before first use, prime pump. After use, clean tip and replace cap.  -     Allergy referral placed  Benign essential hypertension  -     losartan (Cozaar) 25 mg tablet; Take 1 tablet (25 mg) by mouth once daily.  Migraine with aura and  without status migrainosus, not intractable  Chronic daily headache  -     topiramate (Topamax) 50 mg tablet; Take 1 tablet (50 mg) by mouth 2 times a day.  -     ibuprofen 800 mg tablet; Take 1 tablet (800 mg) by mouth every 8 hours if needed for moderate pain (4 - 6).    FU in 4 months    THEODORE Kinney-CNP  [facility]

## 2025-05-09 LAB
25(OH)D3+25(OH)D2 SERPL-MCNC: 28 NG/ML (ref 30–100)
A ALTERNATA IGE QN: 0.13 KU/L
A ALTERNATA IGE RAST: ABNORMAL
A FUMIGATUS IGE QN: <0.1 KU/L
A FUMIGATUS IGE RAST: 0
BERMUDA GRASS IGE QN: <0.1 KU/L
BERMUDA GRASS IGE RAST: 0
BOXELDER IGE QN: 0.7 KU/L
BOXELDER IGE RAST: 2
C HERBARUM IGE QN: <0.1 KU/L
C HERBARUM IGE RAST: 0
CA-I SERPL-MCNC: 5.1 MG/DL (ref 4.7–5.5)
CALIF WALNUT POLN IGE QN: 0.38 KU/L
CALIF WALNUT POLN IGE RAST: 1
CAT DANDER IGE QN: <0.1 KU/L
CAT DANDER IGE RAST: 0
CMN PIGWEED IGE QN: <0.1 KU/L
CMN PIGWEED IGE RAST: 0
COMMON RAGWEED IGE QN: <0.1 KU/L
COMMON RAGWEED IGE RAST: 0
COTTONWOOD IGE QN: <0.1 KU/L
COTTONWOOD IGE RAST: 0
D FARINAE IGE QN: 2 KU/L
D FARINAE IGE RAST: 2
D PTERONYSS IGE QN: 1.68 KU/L
D PTERONYSS IGE RAST: 2
DOG (RCAN F) 1 IGE QN: ABNORMAL
DOG (RCAN F) 2 IGE QN: ABNORMAL
DOG (RCAN F) 4 IGE QN: ABNORMAL K[IU]/ML
DOG (RCAN F) 5 IGE QN: ABNORMAL
DOG (RCAN F) 6 IGE QN: ABNORMAL
DOG DANDER IGE QN: 0.27 KU/L
DOG DANDER IGE RAST: ABNORMAL
FERRITIN SERPL-MCNC: 19 NG/ML (ref 16–232)
IGE SERPL-ACNC: 29 KU/L
IRON SATN MFR SERPL: 25 % (CALC) (ref 16–45)
IRON SERPL-MCNC: 86 MCG/DL (ref 40–190)
LONDON PLANE IGE QN: <0.1 KU/L
LONDON PLANE IGE RAST: 0
MOUSE URINE PROT IGE QN: <0.1 KU/L
MOUSE URINE PROT IGE RAST: 0
MT JUNIPER IGE QN: <0.1 KU/L
MT JUNIPER IGE RAST: 0
P NOTATUM IGE QN: <0.1 KU/L
P NOTATUM IGE RAST: 0
PECAN/HICK TREE IGE QN: 1.31 KU/L
PECAN/HICK TREE IGE RAST: 2
QUEST DOG DANDER COMP PNL CAN F 3 (E221) IGE: ABNORMAL
REF LAB TEST REF RANGE: NORMAL
ROACH IGE QN: <0.1 KU/L
ROACH IGE RAST: 0
SALTWORT IGE QN: <0.1 KU/L
SALTWORT IGE RAST: 0
SHEEP SORREL IGE QN: <0.1 KU/L
SHEEP SORREL IGE RAST: 0
SILVER BIRCH IGE QN: 0.13 KU/L
SILVER BIRCH IGE RAST: ABNORMAL
TIBC SERPL-MCNC: 350 MCG/DL (CALC) (ref 250–450)
TIMOTHY IGE QN: <0.1 KU/L
TIMOTHY IGE RAST: 0
WHITE ASH IGE QN: 0.32 KU/L
WHITE ASH IGE RAST: ABNORMAL
WHITE ELM IGE QN: <0.1 KU/L
WHITE ELM IGE RAST: 0
WHITE MULBERRY IGE QN: <0.1 KU/L
WHITE MULBERRY IGE RAST: 0
WHITE OAK IGE QN: 0.17 KU/L
WHITE OAK IGE RAST: ABNORMAL

## 2025-05-12 LAB
A ALTERNATA IGE QN: 0.13 KU/L
A ALTERNATA IGE RAST: ABNORMAL
A FUMIGATUS IGE QN: <0.1 KU/L
A FUMIGATUS IGE RAST: 0
BERMUDA GRASS IGE QN: <0.1 KU/L
BERMUDA GRASS IGE RAST: 0
BOXELDER IGE QN: 0.7 KU/L
BOXELDER IGE RAST: 2
C HERBARUM IGE QN: <0.1 KU/L
C HERBARUM IGE RAST: 0
CALIF WALNUT POLN IGE QN: 0.38 KU/L
CALIF WALNUT POLN IGE RAST: 1
CAT DANDER IGE QN: <0.1 KU/L
CAT DANDER IGE RAST: 0
CMN PIGWEED IGE QN: <0.1 KU/L
CMN PIGWEED IGE RAST: 0
COMMON RAGWEED IGE QN: <0.1 KU/L
COMMON RAGWEED IGE RAST: 0
COTTONWOOD IGE QN: <0.1 KU/L
COTTONWOOD IGE RAST: 0
D FARINAE IGE QN: 2 KU/L
D FARINAE IGE RAST: 2
D PTERONYSS IGE QN: 1.68 KU/L
D PTERONYSS IGE RAST: 2
DOG (RCAN F) 1 IGE QN: <0.1 KU/L
DOG (RCAN F) 2 IGE QN: <0.1 KU/L
DOG (RCAN F) 4 IGE QN: <0.1 KU/L
DOG (RCAN F) 5 IGE QN: 0.11 KU/L
DOG (RCAN F) 6 IGE QN: <0.1 KU/L
DOG DANDER IGE QN: 0.27 KU/L
DOG DANDER IGE RAST: ABNORMAL
IGE SERPL-ACNC: 29 KU/L
LONDON PLANE IGE QN: <0.1 KU/L
LONDON PLANE IGE RAST: 0
MOUSE URINE PROT IGE QN: <0.1 KU/L
MOUSE URINE PROT IGE RAST: 0
MT JUNIPER IGE QN: <0.1 KU/L
MT JUNIPER IGE RAST: 0
P NOTATUM IGE QN: <0.1 KU/L
P NOTATUM IGE RAST: 0
PECAN/HICK TREE IGE QN: 1.31 KU/L
PECAN/HICK TREE IGE RAST: 2
QUEST DOG DANDER COMP PNL CAN F 3 (E221) IGE: <0.1 KU/L
REF LAB TEST REF RANGE: NORMAL
ROACH IGE QN: <0.1 KU/L
ROACH IGE RAST: 0
SALTWORT IGE QN: <0.1 KU/L
SALTWORT IGE RAST: 0
SHEEP SORREL IGE QN: <0.1 KU/L
SHEEP SORREL IGE RAST: 0
SILVER BIRCH IGE QN: 0.13 KU/L
SILVER BIRCH IGE RAST: ABNORMAL
TIMOTHY IGE QN: <0.1 KU/L
TIMOTHY IGE RAST: 0
WHITE ASH IGE QN: 0.32 KU/L
WHITE ASH IGE RAST: ABNORMAL
WHITE ELM IGE QN: <0.1 KU/L
WHITE ELM IGE RAST: 0
WHITE MULBERRY IGE QN: <0.1 KU/L
WHITE MULBERRY IGE RAST: 0
WHITE OAK IGE QN: 0.17 KU/L
WHITE OAK IGE RAST: ABNORMAL

## 2025-05-13 NOTE — PROGRESS NOTES
Chief Complaint  New patient, high risk breast cancer surveillance    History Of Present Illness  Mike Chase is a pleasant 40 y.o. female presenting for a high risk breast cancer evaluation. She denies breast biopsy or surgery. She has family history breast cancer in mother and 2 maternal aunts.      BREAST IMAGIN/3/2025 Bilateral screening mammogram, BI-RADS Category 1.       REPRODUCTIVE HISTORY: menarche age 8, , first birth age 13,  24 months, OCP's > 20 years, premenopausal, scattered breast tissue          FAMILY CANCER HISTORY:   Mother: Breast cancer age 64  Maternal aunt: Breast cancer age 45  Maternal aunt: Breast cancer age 60  Maternal grandfather: Prostate and lung cancer    Review of Systems  Constitutional:  Negative for appetite change, fatigue, fever and unexpected weight change.   HENT:  Negative for ear pain, hearing loss, nosebleeds, sore throat and trouble swallowing.    Eyes:  Negative for discharge, itching and visual disturbance.   Breast: As stated in HPI.  Respiratory:  Negative for cough, chest tightness and shortness of breath.    Cardiovascular:  Negative for chest pain, palpitations and leg swelling.   Gastrointestinal:  Negative for abdominal pain, constipation, diarrhea and nausea.   Endocrine: Negative for cold intolerance and heat intolerance.   Genitourinary:  Negative for dysuria, frequency, hematuria, pelvic pain and vaginal bleeding.   Musculoskeletal:  Negative for arthralgias, back pain, gait problem, joint swelling and myalgias.   Skin:  Negative for color change and rash.   Allergic/Immunologic: Negative for environmental allergies and food allergies.   Neurological:  Negative for dizziness, tremors, speech difficulty, weakness, numbness and headaches.   Hematological:  Does not bruise/bleed easily.   Psychiatric/Behavioral:  Negative for agitation, dysphoric mood and sleep disturbance. The patient is not nervous/anxious.       Surgical  History  She has a past surgical history that includes Cervical biopsy w/ loop electrode excision (2012); Cyst Removal (10/04/2023); Knee arthroscopy w/ meniscal repair (10/2018); and [place] tooth extraction.     Social History  She reports that she has never smoked. She has never used smokeless tobacco. She reports current alcohol use of about 1.0 standard drink of alcohol per week. She reports that she does not use drugs.    Family History  Family History[1]     Allergies  Lisinopril and Minocycline    Past Medical History  She has a past medical history of Allergic, Allergic rhinitis due to pollen (05/26/2016), Arthritis, Blurry vision (02/02/2023), Carbuncle of groin (09/08/2023), Cervical intraepithelial neoplasia grade 2 (2012), Cervicalgia (06/21/2023), Chronic allergic conjunctivitis (02/02/2023), Depression, Diabetes mellitus (Multi), Heart murmur, Internal derangement of knee (02/02/2023), Migraine, unspecified, not intractable, without status migrainosus (07/22/2016), Murmur, cardiac (02/02/2023), and Other chronic diseases of tonsils and adenoids (09/13/2016).    She has no past medical history of Personal history of irradiation.     Physical Exam  Patient is alert and oriented x3 and in a relaxed and appropriate mood. Her gait is steady and hand grasps are equal. Sclera is clear. The breasts are nearly symmetrical. The tissue is soft without palpable abnormalities, discrete nodules or masses. The skin and nipples appear normal. There is no cervical, supraclavicular or axillary lymphadenopathy. Heart rate and rhythm normal, S1 and S2 appreciated. The lungs are clear to auscultation bilaterally. Abdomen is soft and non-tender.      Last Recorded Vitals  Blood pressure 119/77, pulse 71, temperature 37.1 °C (98.7 °F), temperature source Temporal, weight (!) 156 kg (343 lb), SpO2 99%.    Relevant Results and Imaging  BI mammo bilateral screening tomosynthesis 02/03/2025    Narrative  Interpreted By:   Kathy Driver,  STUDY:  BI MAMMO BILATERAL SCREENING TOMOSYNTHESIS;  2/3/2025 11:47 am    ACCESSION NUMBER(S):  [id number]    ORDERING CLINICIAN:  BECKI SHAH    INDICATION:  Screening. History of breast carcinoma in her mother    ,Z01.419 Encounter for gynecological examination (general) (routine)  without abnormal findings,Z12.31 Encounter for screening mammogram  for malignant neoplasm of breast    COMPARISON:  01/31/2024, 01/2023, 07/16/2021    FINDINGS:  2D and tomosynthesis images were reviewed at 1 mm slice thickness.    Density:  There are scattered areas of fibroglandular density.      No suspicious mass, skin thickening, tumoral calcifications or  axillary adenopathy is identified.    Impression  No mammographic evidence of malignancy.    BI-RADS CATEGORY:  BI-RADS Category:  1 Negative.  Recommendation:  Annual Screening.  Recommended Date:  1 Year.  Laterality:  Bilateral.          Provider Impressions  Normal clinical breast exam and imaging, no history breast biopsy or surgery, family history breast cancer, scattered breast tissue      Risk Profile      Patient Discussion/Summary  Your clinical examination and imaging is normal.  Please return in one year for mammogram and office visit or sooner if you have any problems or concerns.     High risk breast surveillance care plan:  Yearly mammogram with digital breast tomosynthesis  Twice yearly clinical breast examinations  Breast MRI-  Monthly self breast examinations &/or regular self breast awareness  Vitamin D3 2000 IU/daily (over the counter) unless your PCP recommends you take a specific dose  Exercise 3-4 times per week for 45-60 minutes  Limit alcohol to 3-4 drinks per week if you are menopausal  Eat healthy low-fat diet with lots of vegetable & fruits          IMPORTANT INFORMATION REGARDING YOUR RESULTS    If you receive medical information from My [facility] Personal Health Record (online chart) your results will be released into your chart.  This means you may view or see results of your biopsy or procedure before I contact you directly. If this occurs, please call the office and we will discuss your results over the phone.     You can see your health information, review clinical summaries from office visits & test results online when you follow your health with MY  Chart, a personal health record. To sign up go to [email or web address]. If you need assistance with signing up or trouble getting into your account call [employer] Patient Line 24/7 at [phone].    My office phone number is [phone] if you need to get in touch with me or have additional questions or concerns. Thank you for choosing [facility] and trusting me as your healthcare provider. I look forward to seeing you again at your next office visit. I am honored to be a provider on your health care team and I remain dedicated to helping you achieve your health goals.    Yesica Euceda, APRN-CNP         [1]   Family History  Problem Relation Name Age of Onset    Breast cancer Mother Mom     Diabetes Mother Mom     Early natural death Mother Mom     Hypertension Mother Mom     Miscarriages / Stillbirths Mother Mom     Breast cancer Mother's Sister Cordelia     Diabetes Mother's Sister Cordelia     Diabetes Maternal Grandmother Mick     Asthma Son Rudi     Asthma Daughter Maranda     Depression Daughter Maranda     Mental illness Brother Brother     Diabetes Brother Brother     Colon cancer Maternal Grandfather  70

## 2025-05-14 ENCOUNTER — OFFICE VISIT (OUTPATIENT)
Dept: SURGICAL ONCOLOGY | Facility: CLINIC | Age: 40
End: 2025-05-14
Payer: COMMERCIAL

## 2025-05-14 VITALS
DIASTOLIC BLOOD PRESSURE: 77 MMHG | SYSTOLIC BLOOD PRESSURE: 119 MMHG | TEMPERATURE: 98.7 F | WEIGHT: 293 LBS | BODY MASS INDEX: 58.88 KG/M2 | HEART RATE: 71 BPM | OXYGEN SATURATION: 99 %

## 2025-05-14 DIAGNOSIS — Z12.39 BREAST CANCER SCREENING, HIGH RISK PATIENT: ICD-10-CM

## 2025-05-14 DIAGNOSIS — Z91.89 INCREASED RISK OF BREAST CANCER: ICD-10-CM

## 2025-05-14 DIAGNOSIS — Z12.31 SCREENING MAMMOGRAM FOR BREAST CANCER: Primary | ICD-10-CM

## 2025-05-14 DIAGNOSIS — E55.9 VITAMIN D DEFICIENCY: Primary | ICD-10-CM

## 2025-05-14 DIAGNOSIS — Z80.3 FAMILY HISTORY OF BREAST CANCER: ICD-10-CM

## 2025-05-14 PROCEDURE — 3044F HG A1C LEVEL LT 7.0%: CPT

## 2025-05-14 PROCEDURE — 3048F LDL-C <100 MG/DL: CPT

## 2025-05-14 PROCEDURE — 4010F ACE/ARB THERAPY RXD/TAKEN: CPT

## 2025-05-14 PROCEDURE — 99214 OFFICE O/P EST MOD 30 MIN: CPT

## 2025-05-14 PROCEDURE — 3074F SYST BP LT 130 MM HG: CPT

## 2025-05-14 PROCEDURE — 99204 OFFICE O/P NEW MOD 45 MIN: CPT

## 2025-05-14 PROCEDURE — 3078F DIAST BP <80 MM HG: CPT

## 2025-05-14 PROCEDURE — 1036F TOBACCO NON-USER: CPT

## 2025-05-14 RX ORDER — ERGOCALCIFEROL 1.25 MG/1
1.25 CAPSULE ORAL WEEKLY
Qty: 12 CAPSULE | Refills: 0 | Status: SHIPPED | OUTPATIENT
Start: 2025-05-14 | End: 2025-08-12

## 2025-05-14 ASSESSMENT — PAIN SCALES - GENERAL: PAINLEVEL_OUTOF10: 0-NO PAIN

## 2025-05-14 NOTE — PATIENT INSTRUCTIONS
Your clinical examination and imaging is normal.  Please return in one year for mammogram and office visit or sooner if you have any problems or concerns.     High risk breast surveillance care plan:  Yearly mammogram with digital breast tomosynthesis  Twice yearly clinical breast examinations  Breast MRI-  Monthly self breast examinations &/or regular self breast awareness  Vitamin D3 2000 IU/daily (over the counter) unless your PCP recommends you take a specific dose  Exercise 3-4 times per week for 45-60 minutes  Limit alcohol to 3-4 drinks per week if you are menopausal  Eat healthy low-fat diet with lots of vegetable & fruits          IMPORTANT INFORMATION REGARDING YOUR RESULTS    If you receive medical information from My [facility] Personal Health Record (online chart) your results will be released into your chart. This means you may view or see results of your biopsy or procedure before I contact you directly. If this occurs, please call the office and we will discuss your results over the phone.     You can see your health information, review clinical summaries from office visits & test results online when you follow your health with MY  Chart, a personal health record. To sign up go to [email or web address]. If you need assistance with signing up or trouble getting into your account call [employer] Patient Line 24/7 at [phone].    My office phone number is [phone] if you need to get in touch with me or have additional questions or concerns. Thank you for choosing [facility] and trusting me as your healthcare provider. I look forward to seeing you again at your next office visit. I am honored to be a provider on your health care team and I remain dedicated to helping you achieve your health goals.

## 2025-06-03 ENCOUNTER — OFFICE VISIT (OUTPATIENT)
Dept: ORTHOPEDIC SURGERY | Facility: HOSPITAL | Age: 40
End: 2025-06-03
Payer: COMMERCIAL

## 2025-06-03 DIAGNOSIS — M84.375D METATARSAL STRESS FRACTURE OF LEFT FOOT WITH ROUTINE HEALING: ICD-10-CM

## 2025-06-03 DIAGNOSIS — E11.9 TYPE 2 DIABETES MELLITUS WITHOUT COMPLICATION, WITH LONG-TERM CURRENT USE OF INSULIN: ICD-10-CM

## 2025-06-03 DIAGNOSIS — M19.072 ARTHRITIS OF LEFT MIDFOOT: ICD-10-CM

## 2025-06-03 DIAGNOSIS — Z79.4 TYPE 2 DIABETES MELLITUS WITHOUT COMPLICATION, WITH LONG-TERM CURRENT USE OF INSULIN: ICD-10-CM

## 2025-06-03 PROCEDURE — 99212 OFFICE O/P EST SF 10 MIN: CPT

## 2025-06-03 PROCEDURE — 3048F LDL-C <100 MG/DL: CPT | Performed by: STUDENT IN AN ORGANIZED HEALTH CARE EDUCATION/TRAINING PROGRAM

## 2025-06-03 PROCEDURE — 4010F ACE/ARB THERAPY RXD/TAKEN: CPT | Performed by: STUDENT IN AN ORGANIZED HEALTH CARE EDUCATION/TRAINING PROGRAM

## 2025-06-03 PROCEDURE — 3044F HG A1C LEVEL LT 7.0%: CPT | Performed by: STUDENT IN AN ORGANIZED HEALTH CARE EDUCATION/TRAINING PROGRAM

## 2025-06-03 PROCEDURE — 99213 OFFICE O/P EST LOW 20 MIN: CPT | Performed by: STUDENT IN AN ORGANIZED HEALTH CARE EDUCATION/TRAINING PROGRAM

## 2025-06-03 RX ORDER — FLASH GLUCOSE SENSOR
KIT MISCELLANEOUS
Qty: 6 EACH | Refills: 3 | Status: SHIPPED | OUTPATIENT
Start: 2025-06-03

## 2025-06-03 ASSESSMENT — PAIN - FUNCTIONAL ASSESSMENT: PAIN_FUNCTIONAL_ASSESSMENT: 0-10

## 2025-06-03 ASSESSMENT — PAIN DESCRIPTION - DESCRIPTORS: DESCRIPTORS: SORE

## 2025-06-03 ASSESSMENT — PAIN SCALES - GENERAL: PAINLEVEL_OUTOF10: 2

## 2025-06-12 NOTE — PROGRESS NOTES
Sports Medicine Office Note    Today's Date:  06/03/2025     HPI: Mike Chase is a 40 y.o. female with history of NIDDM, nondisplaced left calcaneus anterior process fracture (April 2023) who presents today for virtual follow-up of chronic left foot pain.       4/15/2025: She presents today in orthopedic injury clinic for evaluation of chronic left foot pain.  She previously followed with Dr. Magana for management of nondisplaced anterior process of left calcaneus fracture and was placed in walking boot, transitioned out of boot, and followed with physical therapy at that time.  States she did have improvement of her pain at that time, completed course of physical therapy, and continued with home exercises.  States that her pain returned within 1 to 2 months after finishing PT and feels it has been progressively worsening over the last several months.  States she rolled her left ankle 10/31/2024 and subsequently had pain/swelling of the left ankle and midfoot which she treated with rest, ice, compression, elevation and had slight improvement at that time.  States she has had pain across the top of her foot since then that is worse with ambulation and has been progressively worsening.  States she has mild chronic swelling in this area without any bruising, redness, warmth, radiation of pain, numbness, tingling, or weakness.  She has tried OTC anti-inflammatory/analgesics, rest, ice, elevation, and compression without significant improvement.  Has OTC shoe insoles which do provide some relief.     5/6/2025: She presents today for virtual follow-up of chronic left foot pain and MRI review.  MRI left foot obtained 4/30/2025 revealed stress reaction of the fourth metatarsal and fifth metatarsal bones without definitive fracture line, moderate fourth TMT osteoarthrosis.  She has been weightbearing as tolerated in tall walking boot with use of crutches as needed for protected/reduced weightbearing, but states  she has had improvement of her pain, tolerating weightbearing in tall walking boot.  Denies any interval injury/trauma or any new or worsening swelling, redness, warmth, bruising, radiation of pain, numbness, tingling, weakness.    6/3/2025: She presents today for follow-up of left foot pain with stress injury of 4th and 5th metatarsal bones and moderate TMT arthritis.  She has been using tall walking boot for ambulation except when she is at home.  States she has also been using crutches as needed, but has been using these less recently.  States she has had improvement of pain since previous evaluation with marked improvement while using boot and some improvement with weightbearing out of boot.  Denies any interval injury/trauma or any new swelling, redness, warmth, bruising, radiation of pain, numbness, tingling, weakness.     She has no other complaints.     Physical Examination:      SKIN: No increased erythema, warmth, rashes, or concerning skin lesions  NEURO: Sensation is intact in the bilateral lower extremities. Strength is grossly 5 out of 5 throughout the bilateral lower extremities, unless noted below.  GAIT: Slightly antalgic, favoring RLE  MUSCULOSKELETAL: Examination of the left foot: Ankle range of motion is full and pain-free.  There is no obvious swelling.  No obvious ecchymosis.  Strength of the ankle and foot is slightly reduced and painful with resisted eversion.  Mild tenderness to palpation over dorsal aspect of the tarsometatarsal region (particularly near metatarsal bases 2-5). Mild tenderness over metatarsal shafts 2-5 and intermetatarsal spaces. No tenderness to palpation over the Achilles tendon, plantar fascia, calcaneus, navicular, metatarsal heads, 1st MTP joint. Negative calcaneal squeeze. No increased laxity with stress of Lis Franc ligament complex. Metatarsal squeeze elicited mild pain. No Rima click.  Increased pain with weightbearing and toe/heel stance.  Unable to perform hop  test due to pain.     Imaging:  Radiographs of the left foot obtained 4/15/2025 were reviewed and revealed degenerative changes of the ankle joint, tarsal region, and tarsometatarsal region without definitive evidence of acute osseous abnormalities.     MRI left foot obtained 4/30/2025 was reviewed and revealed stress reaction of the fourth metatarsal and fifth metatarsal bones without definitive fracture line, moderate fourth TMT osteoarthrosis.     The studies were reviewed by me personally in the office today.    Problem List Items Addressed This Visit    None  Visit Diagnoses         Codes      Arthritis of left midfoot     M19.072    Relevant Orders    Referral to Physical Therapy      Metatarsal stress fracture of left foot with routine healing     M84.375D    Relevant Orders    Referral to Physical Therapy          Assessment and Plan:    We reviewed the exam and imaging findings and discussed the conservative and surgical treatment options. We agreed to continue with conservative management at this time as she seems to be doing relatively well with current treatment plan. Physical therapy referral provided and discussed the importance of regular home exercises.  Recommended OTC shoe insoles from [employer] and use of wide toed footwear when out of boot.  She may continue to use boot as needed and may start to wean out of boot with weightbearing in 2-3 weeks as tolerated.  Recommended prescription doses of Tylenol and encouraged use of ice, elevation, rest as needed for acute flares of pain.  She may otherwise continue OTC anti-inflammatory such as ibuprofen up to 600 mg 3 times daily with food as needed for acute flares of pain.  Plan for follow-up in 6 weeks for re-evaluation, otherwise may follow-up sooner if any new concerns arise.  Discussed this plan with the patient who is understanding and agreeable.    **This note was dictated using Dragon speech recognition software and was not corrected for  spelling or grammatical errors**.    Walker Woods,   Primary Care Sports Medicine  [facility] [place]

## 2025-07-22 ENCOUNTER — HOSPITAL ENCOUNTER (OUTPATIENT)
Dept: RADIOLOGY | Facility: HOSPITAL | Age: 40
Discharge: HOME | End: 2025-07-22
Payer: COMMERCIAL

## 2025-07-22 ENCOUNTER — OFFICE VISIT (OUTPATIENT)
Dept: ORTHOPEDIC SURGERY | Facility: HOSPITAL | Age: 40
End: 2025-07-22
Payer: COMMERCIAL

## 2025-07-22 DIAGNOSIS — M84.375D METATARSAL STRESS FRACTURE OF LEFT FOOT WITH ROUTINE HEALING: ICD-10-CM

## 2025-07-22 DIAGNOSIS — M84.375D METATARSAL STRESS FRACTURE OF LEFT FOOT WITH ROUTINE HEALING: Primary | ICD-10-CM

## 2025-07-22 PROCEDURE — 3044F HG A1C LEVEL LT 7.0%: CPT | Performed by: STUDENT IN AN ORGANIZED HEALTH CARE EDUCATION/TRAINING PROGRAM

## 2025-07-22 PROCEDURE — 99212 OFFICE O/P EST SF 10 MIN: CPT | Performed by: STUDENT IN AN ORGANIZED HEALTH CARE EDUCATION/TRAINING PROGRAM

## 2025-07-22 PROCEDURE — 4010F ACE/ARB THERAPY RXD/TAKEN: CPT | Performed by: STUDENT IN AN ORGANIZED HEALTH CARE EDUCATION/TRAINING PROGRAM

## 2025-07-22 PROCEDURE — 99213 OFFICE O/P EST LOW 20 MIN: CPT | Performed by: STUDENT IN AN ORGANIZED HEALTH CARE EDUCATION/TRAINING PROGRAM

## 2025-07-22 PROCEDURE — 73630 X-RAY EXAM OF FOOT: CPT | Mod: LT

## 2025-07-22 PROCEDURE — 73630 X-RAY EXAM OF FOOT: CPT | Mod: LEFT SIDE | Performed by: RADIOLOGY

## 2025-07-22 NOTE — PROGRESS NOTES
Sports Medicine Office Note    Today's Date:  07/22/2025     HPI: Mike Chase is a 40 y.o. female with history of NIDDM, nondisplaced left calcaneus anterior process fracture (April 2023) who presents today for virtual follow-up of chronic left foot pain.       4/15/2025: She presents today in orthopedic injury clinic for evaluation of chronic left foot pain.  She previously followed with Dr. Magana for management of nondisplaced anterior process of left calcaneus fracture and was placed in walking boot, transitioned out of boot, and followed with physical therapy at that time.  States she did have improvement of her pain at that time, completed course of physical therapy, and continued with home exercises.  States that her pain returned within 1 to 2 months after finishing PT and feels it has been progressively worsening over the last several months.  States she rolled her left ankle 10/31/2024 and subsequently had pain/swelling of the left ankle and midfoot which she treated with rest, ice, compression, elevation and had slight improvement at that time.  States she has had pain across the top of her foot since then that is worse with ambulation and has been progressively worsening.  States she has mild chronic swelling in this area without any bruising, redness, warmth, radiation of pain, numbness, tingling, or weakness.  She has tried OTC anti-inflammatory/analgesics, rest, ice, elevation, and compression without significant improvement.  Has OTC shoe insoles which do provide some relief.     5/6/2025: She presents today for virtual follow-up of chronic left foot pain and MRI review.  MRI left foot obtained 4/30/2025 revealed stress reaction of the fourth metatarsal and fifth metatarsal bones without definitive fracture line, moderate fourth TMT osteoarthrosis.  She has been weightbearing as tolerated in tall walking boot with use of crutches as needed for protected/reduced weightbearing, but states  she has had improvement of her pain, tolerating weightbearing in tall walking boot.  Denies any interval injury/trauma or any new or worsening swelling, redness, warmth, bruising, radiation of pain, numbness, tingling, weakness.    6/3/2025: She presents today for follow-up of left foot pain with stress injury of 4th and 5th metatarsal bones and moderate TMT arthritis.  She has been using tall walking boot for ambulation except when she is at home.  States she has also been using crutches as needed, but has been using these less recently.  States she has had improvement of pain since previous evaluation with marked improvement while using boot and some improvement with weightbearing out of boot.  Denies any interval injury/trauma or any new swelling, redness, warmth, bruising, radiation of pain, numbness, tingling, weakness.    7/22/2025: Returns to clinic with improvement of her lateral left foot pain. She is now out of the boot most of the time. She uses OTC shoe insoles from [employer] as well as [employer] shoes. She does have 5/10 achy pain in the 4th and 5th metatarsal that is worse at the end of the day, but mentions it is tolerable. She is not using ice, heat, nor oral/topical medications at this time. She has not reached out to physical therapy yet, stating challenges with taking the time out of her work schedule. She continues to take vitamin D supplementation as previously discussed. Denies any interval injury/trauma or any new swelling, redness, warmth, bruising, radiation of pain, numbness, tingling, weakness.     She has no other complaints.     Physical Examination:      SKIN: No increased erythema, warmth, rashes, or concerning skin lesions  NEURO: Sensation is intact in the bilateral lower extremities. Strength is grossly 5 out of 5 throughout the bilateral lower extremities, unless noted below.  GAIT: Slightly antalgic, favoring RLE  MUSCULOSKELETAL: Examination of the left foot: Ankle range of  motion is full and pain-free.  There is no obvious swelling.  No obvious ecchymosis.  Strength of the ankle and foot is slightly reduced and painful with resisted eversion. Mild tenderness over metatarsal shafts 4 and 5 and intermetatarsal spaces. No tenderness to palpation over the Achilles tendon, plantar fascia, calcaneus, navicular, metatarsal heads, 1st MTP joint. Negative calcaneal squeeze. No increased laxity with stress of Lis Franc ligament complex. Metatarsal squeeze elicited mild pain. No Rima click.  Increased pain with weightbearing and toe/heel stance. Foot rests in slight inverted position.     Imaging:  Radiographs of the left foot obtained 4/15/2025 were reviewed and revealed degenerative changes of the ankle joint, tarsal region, and tarsometatarsal region without definitive evidence of acute osseous abnormalities.     MRI left foot obtained 4/30/2025 was reviewed and revealed stress reaction of the fourth metatarsal and fifth metatarsal bones without definitive fracture line, moderate fourth TMT osteoarthrosis.     Radiographs of the left foot obtained today 7/22/2025 were reviewed and revealed degenerative changes of the ankle joint, tarsal region, and tarsometatarsal region without definitive evidence of acute osseous abnormalities.  The studies were reviewed by me personally in the office today.    Problem List Items Addressed This Visit           ICD-10-CM       Musculoskeletal and Injuries    Metatarsal stress fracture of left foot with routine healing - Primary M84.375D    Relevant Orders    XR foot left 3+ views     Assessment and Plan:    We reviewed the exam and imaging findings and discussed the conservative and surgical treatment options. We agreed to continue with conservative management at this time as she seems to be doing relatively well with current treatment plan. Encouraged to set up Physical therapy appointments to receive treatment and provided with home exercises. PT  referral had been previously given. Recommended continued use of OTC shoe insoles from [employer] and use of wide toed footwear when out of boot Recommended prescription doses of Tylenol and encouraged use of ice, elevation, rest as needed for acute flares of pain.  She may otherwise continue OTC anti-inflammatory such as ibuprofen up to 600 mg 3 times daily with food as needed for acute flares of pain.  Plan for follow-up as needed if any new concerns arise. Discussed this plan with the patient who is understanding and agreeable.    **This note was dictated using Dragon speech recognition software and was not corrected for spelling or grammatical errors**.    Vicente Shirley,   PMR PGY-1  [facility]    I confirm that I was present for the key and critical portions of the service, including a review of the patient's history and other pertinent data.  I personally examined the patient, and formulated the evaluation and/or treatment plan.  I have reviewed the note of the resident and agree with the findings documented in the note, with any exceptions as noted below.    **This note was dictated using Dragon speech recognition software and was not corrected for spelling or grammatical errors**.    Walker Woods,   Primary Care Sports Medicine  [facility] [place]    28-Nov-2023 21:00

## 2025-09-23 ENCOUNTER — APPOINTMENT (OUTPATIENT)
Dept: PRIMARY CARE | Facility: CLINIC | Age: 40
End: 2025-09-23
Payer: COMMERCIAL

## (undated) DEVICE — DRAPE, SHEET, ENDOSCOPY, GENERAL, FENESTRATED, ARMBOARD COVER, 98 X 123.5 IN, DISPOSABLE, LF, STERILE

## (undated) DEVICE — SUTURE, VICRYL, 3-0, 27 IN, SH

## (undated) DEVICE — SALINE NORMAL (100/PK)

## (undated) DEVICE — SUTURE, MONOCRYL, 4-0, 18 IN, PS2, UNDYED

## (undated) DEVICE — GLOVE, SURGICAL, PROTEXIS PI ORTHO, 7.0, PF, LF

## (undated) DEVICE — Device

## (undated) DEVICE — SUTURE, VICRYL, 2-0, 27 IN, SH, UNDYED